# Patient Record
Sex: FEMALE | Race: WHITE | NOT HISPANIC OR LATINO | URBAN - METROPOLITAN AREA
[De-identification: names, ages, dates, MRNs, and addresses within clinical notes are randomized per-mention and may not be internally consistent; named-entity substitution may affect disease eponyms.]

---

## 2018-04-05 LAB — HCV AB SER-ACNC: NEGATIVE

## 2021-04-08 DIAGNOSIS — Z23 ENCOUNTER FOR IMMUNIZATION: ICD-10-CM

## 2023-02-16 ENCOUNTER — EVALUATION (OUTPATIENT)
Dept: PHYSICAL THERAPY | Age: 63
End: 2023-02-16

## 2023-02-16 DIAGNOSIS — M75.52 BURSITIS OF LEFT SHOULDER: Primary | ICD-10-CM

## 2023-02-16 NOTE — PROGRESS NOTES
PT Evaluation     Today's date: 2023  Patient name: Romario Holcomb  : 1960  MRN: [id number]  Referring provider: Nestor Alarcon  Dx:   Encounter Diagnosis     ICD-10-CM    1  Bursitis of left shoulder  M75 52                      Assessment  Assessment details: Patient with L shoulder bursitis - r/o internal derangement if no progress is achieved  Patient with decreased AROM, PROM, weakness, and postural dysfunction  Patient is a fair candidate for PT intervention  Impairments: abnormal muscle tone, abnormal or restricted ROM, impaired physical strength, lacks appropriate home exercise program and pain with function  Understanding of Dx/Px/POC: good   Prognosis: good    Goals  Short Term goals - 4 weeks  1  Patient will be independent HEP  2   Patient will report a 50% decrease in pain complaints  3   Increase strength 1/2 grade  4   Increase ROM 5-10 degrees  Long Term goals - 8 weeks  1  Patient will report elimination of pain complaints  2   Patient will return to all work related activities without restriction  3   Patient will return to all recreational activities without restriction  4   ROM WFL  5   Strength 5/5  Plan  Planned therapy interventions: manual therapy, strengthening, stretching and therapeutic exercise  Frequency: 2x week  Duration in weeks: 6        Subjective Evaluation    History of Present Illness  Mechanism of injury: Patient reports an onset of L shoulder pain a couple weeks ago  No GUERLINE  Current sx's:  L anterior/lateral shoulder pain with some discomfort into lateral deltoid region  Had injection which has helped sx's significantly  Patient is L hand dominant  Neg cervical sx's  Neg previous history of shoulder issues  Sx's aggravated by lifting  Neg c/o weakness    Quality of life: good    Pain  Current pain rating: 3  At best pain ratin  At worst pain ratin  Aggravating factors: overhead activity and lifting  Progression: improved    Patient Goals  Patient goals for therapy: increased motion, decreased pain, increased strength and independence with ADLs/IADLs          Objective     Passive Range of Motion   Left Shoulder   Flexion: 150 degrees with pain  External rotation 90°: 90 degrees   Internal rotation 90°: 50 degrees     Strength/Myotome Testing     Left Shoulder     Planes of Motion   Flexion: 4-   Abduction: 3+   External rotation at 0°: 3+   Internal rotation at 0°: 4     Tests     Left Shoulder   Positive drop arm, Neer's and painful arc  Negative external rotation lag sign, internal rotation lag sign, ULTT1, ULTT2, ULTT3 and ULTT4                Precautions: None      Manuals             PROM - gentle stretching into IR             Jt mobs - grade III AP and PA             MRE's IR/ER                          Neuro Re-Ed                                                                                                        Ther Ex             Sidelying ER             Prone abd             Prone ext                          [place] rows             Lillie shld ext             [employer] IR                                                                                                                     Ther Activity                                       Gait Training                                       Modalities

## 2023-02-20 ENCOUNTER — OFFICE VISIT (OUTPATIENT)
Dept: PHYSICAL THERAPY | Age: 63
End: 2023-02-20

## 2023-02-20 DIAGNOSIS — M75.52 BURSITIS OF LEFT SHOULDER: Primary | ICD-10-CM

## 2023-02-20 NOTE — PROGRESS NOTES
Daily Note     Today's date: 2023  Patient name: Taty Bonilla  : 1960  MRN: [id number]  Referring provider: Patricia Kline  Dx:   Encounter Diagnosis     ICD-10-CM    1  Bursitis of left shoulder  M75 52           Start Time:   Stop Time:   Total time in clinic (min): 45 minutes    Subjective: Pt denies any change in symptoms since previous session  She denies medical updates since previous visit  Objective: See treatment diary below      Assessment: Radha  tolerated treatment well with consistent cuing throughout  TE's were performed with increased resistance and increased reps  New TE's were demonstrated with proper technique, and tolerated well  Following treatment, the patient exhibited good technique with therapeutic exercises and would benefit from continued PT  Plan: Continue per plan of care        Precautions: None      Manuals             PROM - gentle stretching into IR FH            Jt mobs - grade III AP and PA             MRE's IR/ER FH x15            STM Biceps/Tric FH            Neuro Re-Ed                                                                                                        Ther Ex             Sidelying ER 2# 3x10            Prone abd 3x10            Prone ext 2# 3x10                         Lillie rows 10# 3x10            Lillie shld ext 6# 3x10            [place] IR 3# 3x10                                                                                                                    Ther Activity                                       Gait Training                                       Modalities No

## 2023-02-22 ENCOUNTER — OFFICE VISIT (OUTPATIENT)
Dept: PHYSICAL THERAPY | Age: 63
End: 2023-02-22

## 2023-02-22 DIAGNOSIS — M75.52 BURSITIS OF LEFT SHOULDER: Primary | ICD-10-CM

## 2023-02-22 NOTE — PROGRESS NOTES
Daily Note     Today's date: 2023  Patient name: Letha Alvarez  : 1960  MRN: [id number]  Referring provider: Awa Jenkins  Dx:   Encounter Diagnosis     ICD-10-CM    1  Bursitis of left shoulder  M75 52                      Subjective: Patient states after STM to bicep LV it made a difference in pain levels  Objective: See treatment diary below      Assessment: Tolerated treatment well  Able to increase reps and hold times showing improvement in muscular endurance  SL abd introduced for cont scapular strengthening with no increased pain or discomfort  Improvement in discomfort with shoulder ext this date compared to last, IR cont give feeling of tightness to lateral UE  Patient demonstrated fatigue post treatment and would benefit from continued PT      Plan: Continue per plan of care        Precautions: None      Manuals            PROM - gentle stretching into IR FH DELIA           Jt mobs - grade III AP and PA             MRE's IR/ER FH x15 DELIA x 15 ea at 90*            STM Biceps/Tric FH DELIA            Neuro Re-Ed                                                                                                        Ther Ex             Sidelying ER 2# 3x10 2#  2x15           Prone abd 3x10 3x10            Prone ext 2# 3x10 2# 3x10 3"           Sidelying Abd  1#   2x15           [place] rows 10# 3x10 10# 1x10  12#  2x10           Lillie shld ext 6# 3x10 6# 3x10           Lillie IR 3# 3x10 3#  3x10            [place] for ROM   2'/2'  Flex/scap                                                                                                      Ther Activity                                       Gait Training                                       Modalities

## 2023-02-27 ENCOUNTER — OFFICE VISIT (OUTPATIENT)
Dept: PHYSICAL THERAPY | Age: 63
End: 2023-02-27

## 2023-02-27 DIAGNOSIS — M75.52 BURSITIS OF LEFT SHOULDER: Primary | ICD-10-CM

## 2023-02-27 NOTE — PROGRESS NOTES
Daily Note     Today's date: 2023  Patient name: Thierno Johnston  : 1960  MRN: [id number]  Referring provider: Spencer Antonio  Dx:   Encounter Diagnosis     ICD-10-CM    1  Bursitis of left shoulder  M75 52                      Subjective: Patient with some increased sx's secondary       Objective: See treatment diary below      Assessment: Tolerated treatment well  Patient would benefit from continued PT      Plan: Continue per plan of care        Precautions: None      Manuals           PROM - gentle stretching into IR FH DELIA JH          Jt mobs - grade III AP and PA   completed          MRE's IR/ER FH x15 DELIA x 15 ea at 90*  completd          STM Biceps/Tric FH DELIA  JH          Neuro Re-Ed                                                                                                        Ther Ex             Sidelying ER 2# 3x10 2#  2x15 2#          Prone abd 3x10 3x10  1#          Prone ext 2# 3x10 2# 3x10 3" 1#          Sidelying Abd  1#   2x15 1#          Lillie rows 10# 3x10 10# 1x10  12#  2x10 completed          [place] shld ext 6# 3x10 6# 3x10 completd          Lillie IR 3# 3x10 3#  3x10  completed          [place] for ROM   2'/2'  Flex/scap nt                                                                                                     Ther Activity                                       Gait Training                                       Modalities

## 2023-03-01 ENCOUNTER — OFFICE VISIT (OUTPATIENT)
Dept: PHYSICAL THERAPY | Age: 63
End: 2023-03-01

## 2023-03-01 DIAGNOSIS — M75.52 BURSITIS OF LEFT SHOULDER: Primary | ICD-10-CM

## 2023-03-01 NOTE — PROGRESS NOTES
Daily Note     Today's date: 3/1/2023  Patient name: Mariano Abdalla  : 1960  MRN: [id number]  Referring provider: Emperatriz Varghese  Dx:   Encounter Diagnosis     ICD-10-CM    1  Bursitis of left shoulder  M75 52                      Subjective: Overall, patient is feeling better      Objective: See treatment diary below      Assessment: Tolerated treatment well  Patient would benefit from continued PT      Plan: One more visit to check progress       Precautions: None      Manuals 2/20 2/22 2/27 3/1         PROM - gentle stretching into IR FH [facility]         Jt mobs - grade III AP and PA   completed completed         MRE's IR/ER FH x15 DELIA x 15 ea at 90*  completd completed         STM Biceps/Tric FH [facility]         Neuro Re-Ed                                                                                                        Ther Ex             Sidelying ER 2# 3x10 2#  2x15 2# 2#         Prone abd 3x10 3x10  1# 1#         Prone ext 2# 3x10 2# 3x10 3" 1# 1#         Sidelying Abd  1#   2x15 1# 1#         [place] rows 10# 3x10 10# 1x10  12#  2x10 completed completed         Lillie shld ext 6# 3x10 6# 3x10 completd completed         Lillie IR 3# 3x10 3#  3x10  completed completed         [place] for ROM   2'/2'  Flex/scap nt nt                                                                                                    Ther Activity                                       Gait Training                                       Modalities

## 2023-03-16 ENCOUNTER — OFFICE VISIT (OUTPATIENT)
Dept: PHYSICAL THERAPY | Age: 63
End: 2023-03-16

## 2023-03-16 DIAGNOSIS — M75.52 BURSITIS OF LEFT SHOULDER: Primary | ICD-10-CM

## 2023-03-17 NOTE — PROGRESS NOTES
Daily Note     Today's date: 3/17/2023  Patient name: Garcia Peter  : 1960  MRN: [id number]  Referring provider: Leonie Agarwal  Dx:   Encounter Diagnosis     ICD-10-CM    1  Bursitis of left shoulder  M75 52                      Subjective: Patient feeling good - some persist with reaching activities and elevations but overall 90-95%,      Objective: See treatment diary below      Assessment: Tolerated treatment well         Plan: D/C to HEP     Precautions: None      Manuals 2/20 2/22 2/27 3/1 3/17        PROM - gentle stretching into IR FH [facility]        Jt mobs - grade III AP and PA   completed completed completed        MRE's IR/ER FH x15 DELIA x 15 ea at 90*  completd completed completed        STM Biceps/Tric FH [facility]        Neuro Re-Ed                                                                                                        Ther Ex             Sidelying ER 2# 3x10 2#  2x15 2# 2# 2#        Prone abd 3x10 3x10  1# 1# 2#        Prone ext 2# 3x10 2# 3x10 3" 1# 1# 2#        Sidelying Abd  1#   2x15 1# 1# 2#        [place] rows 10# 3x10 10# 1x10  12#  2x10 completed completed completed        [place] shld ext 6# 3x10 6# 3x10 completd completed completed        Lillie IR 3# 3x10 3#  3x10  completed completed completed        [place] for ROM   2'/2'  Flex/scap nt nt nt                                                                                                   Ther Activity                                       Gait Training                                       Modalities

## 2023-07-19 ENCOUNTER — OFFICE VISIT (OUTPATIENT)
Dept: GASTROENTEROLOGY | Facility: AMBULARY SURGERY CENTER | Age: 63
End: 2023-07-19
Payer: COMMERCIAL

## 2023-07-19 VITALS
DIASTOLIC BLOOD PRESSURE: 68 MMHG | HEIGHT: 65 IN | BODY MASS INDEX: 31.02 KG/M2 | SYSTOLIC BLOOD PRESSURE: 116 MMHG | RESPIRATION RATE: 18 BRPM | WEIGHT: 186.2 LBS | HEART RATE: 74 BPM

## 2023-07-19 DIAGNOSIS — Z12.11 SCREENING FOR COLON CANCER: ICD-10-CM

## 2023-07-19 DIAGNOSIS — K21.9 GASTROESOPHAGEAL REFLUX DISEASE, UNSPECIFIED WHETHER ESOPHAGITIS PRESENT: Primary | ICD-10-CM

## 2023-07-19 PROCEDURE — 99204 OFFICE O/P NEW MOD 45 MIN: CPT | Performed by: INTERNAL MEDICINE

## 2023-07-19 RX ORDER — HYDROCHLOROTHIAZIDE 25 MG/1
25 TABLET ORAL DAILY
COMMUNITY
Start: 2021-06-01 | End: 2023-09-29

## 2023-07-19 RX ORDER — DIPHENOXYLATE HYDROCHLORIDE AND ATROPINE SULFATE 2.5; .025 MG/1; MG/1
1 TABLET ORAL DAILY
COMMUNITY

## 2023-07-19 RX ORDER — OMEPRAZOLE 40 MG/1
40 CAPSULE, DELAYED RELEASE ORAL DAILY
COMMUNITY
Start: 2023-05-03 | End: 2023-07-20 | Stop reason: SDUPTHER

## 2023-07-19 NOTE — PROGRESS NOTES
[facility] - Outpatient Consultation  Pedro Hernandez 58 y.o. female MRN: [id number]  Encounter: [id number]      PCP: ADI Sue  Referring: Referral Self  No address on file      ASSESSMENT AND PLAN:      1. Gastroesophageal reflux disease, unspecified whether esophagitis present  Discussed anti-reflux measures, and offered handout detailing, including weight loss, avoidance of lying down after meals, recognize/avoid trigger foods, and elevating the head of bed. Continue omeprazole, partial improvement  - EGD; Future, assess for erosive disease or other    2. Screening for colon cancer  Last colonoscopy 10 years ago, will plan for repeat screening at this time  - Colonoscopy; Future  - sodium picosulfate, magnesium oxide, citric acid (Clenpiq) oral solution; Take 175 mL (1 bottle) the evening before the colonoscopy, between 5 PM and 9 PM, followed by a second 175 mL bottle 5 hours before the colonoscopy. Dispense: 350 mL; Refill: 0      ______________________________________________________________________    CC:  Chief Complaint   Patient presents with   • GERD       HPI:      Patient is a 56-year-old female referred for GERD, family history of colon cancer. She has acid reflux, hiatal hernia, allergic rhinitis, atrophic vaginitis, basal cell carcinoma of the skin, HTN, polyarthritis, prediabetes, vitamin D deficiency, BMI 31. She has 4 months of worsening heartburn, acid reflux symptoms. She is worried about her hiatal hernia. Symptoms were exacerbated when she started potassium replacement, did not improve when she stopped it. She has been taking omeprazole daily for the last 2 months, with partial relief of her symptoms. She does feel like they are now a 5 out of 10 in severity, previously severe. He denies any dysphagia, unintentional weight loss. She has daily bowel movements, and denies any rectal bleeding.   Her maternal grandfather was diagnosed with colon cancer in his 76s. Her last colonoscopy was performed over 10 years ago. REVIEW OF SYSTEMS:    CONSTITUTIONAL: Denies any fever, chills, rigors, and weight loss. HEENT: No earache or tinnitus. Denies hearing loss or visual disturbances. CARDIOVASCULAR: No chest pain or palpitations. RESPIRATORY: Denies any cough, hemoptysis, shortness of breath or dyspnea on exertion. GASTROINTESTINAL: As noted in the History of Present Illness. GENITOURINARY: No problems with urination. Denies any hematuria or dysuria. NEUROLOGIC: No dizziness or vertigo, denies headaches. MUSCULOSKELETAL: Denies any muscle or joint pain. SKIN: Denies skin rashes or itching. ENDOCRINE: Denies excessive thirst. Denies intolerance to heat or cold. PSYCHOSOCIAL: Denies depression or anxiety. Denies any recent memory loss. Historical Information   History reviewed. No pertinent past medical history. Past Surgical History:   Procedure Laterality Date   • COLONOSCOPY       Social History   Social History     Substance and Sexual Activity   Alcohol Use Yes    Comment: rarely     Social History     Substance and Sexual Activity   Drug Use Never     Social History     Tobacco Use   Smoking Status Never   Smokeless Tobacco Never     Family History   Problem Relation Age of Onset   • Colon cancer Maternal Grandfather        Meds/Allergies       Current Outpatient Medications:   •  hydrochlorothiazide (HYDRODIURIL) 25 mg tablet  •  multivitamin (THERAGRAN) TABS  •  omeprazole (PriLOSEC) 40 MG capsule    No Known Allergies        Objective     Blood pressure 116/68, pulse 74, resp. rate 18, height 5' 5" (1.651 m), weight 84.5 kg (186 lb 3.2 oz). Body mass index is 30.99 kg/m². PHYSICAL EXAM:      General Appearance:   Alert, cooperative, no distress   HEENT:   Normocephalic, atraumatic, anicteric.      Neck:  Supple, symmetrical, trachea midline   Lungs:   Clear to auscultation bilaterally; no rales, rhonchi or wheezing; respirations unlabored    Heart[de-identified]   Regular rate and rhythm; no murmur, rub, or gallop. Abdomen:   Soft, non-tender, non-distended; normal bowel sounds; no masses, no organomegaly    Genitalia:   Deferred    Rectal:   Deferred    Extremities:  No cyanosis, clubbing or edema    Pulses:  2+ and symmetric    Skin:  No jaundice, rashes, or lesions    Lymph nodes:  No palpable cervical lymphadenopathy        Lab Results:     No results found for: "WBC", "HGB", "HCT", "MCV", "PLT"    No results found for: "NA", "K", "CL", "CO2", "ANIONGAP", "BUN", "CREATININE", "GLUCOSE", "GLUF", "CALCIUM", "CORRECTEDCA", "AST", "ALT", "ALKPHOS", "PROT", "BILITOT", "EGFR"    No results found for: "INR", "PROTIME"      Radiology Results:   No results found. Portions of the record may have been created with voice recognition software. Occasional wrong word or "sound a like" substitutions may have occurred due to the inherent limitations of voice recognition software. Read the chart carefully and recognize, using context, where substitutions have occurred.

## 2023-07-20 DIAGNOSIS — K21.9 GASTROESOPHAGEAL REFLUX DISEASE, UNSPECIFIED WHETHER ESOPHAGITIS PRESENT: Primary | ICD-10-CM

## 2023-07-24 RX ORDER — OMEPRAZOLE 40 MG/1
40 CAPSULE, DELAYED RELEASE ORAL DAILY
Qty: 30 CAPSULE | Refills: 11 | Status: SHIPPED | OUTPATIENT
Start: 2023-07-24 | End: 2024-07-23

## 2023-07-25 NOTE — PATIENT INSTRUCTIONS
Scheduled date of EGD/colonoscopy (as of today):  10/16/23  Physician performing EGD/colonoscopy: Dr Mike Mejia   Location of EGD/colonoscopy:  [facility]   Desired bowel prep reviewed with patient: Clenpiq  Instructions reviewed with patient by: Lucero SCHUMACHER   Clearances:   N/A

## 2023-08-14 ENCOUNTER — OFFICE VISIT (OUTPATIENT)
Dept: INTERNAL MEDICINE CLINIC | Facility: CLINIC | Age: 63
End: 2023-08-14
Payer: COMMERCIAL

## 2023-08-14 VITALS
OXYGEN SATURATION: 99 % | BODY MASS INDEX: 30.66 KG/M2 | HEIGHT: 65 IN | SYSTOLIC BLOOD PRESSURE: 128 MMHG | DIASTOLIC BLOOD PRESSURE: 80 MMHG | HEART RATE: 85 BPM | WEIGHT: 184 LBS

## 2023-08-14 DIAGNOSIS — K21.9 GASTROESOPHAGEAL REFLUX DISEASE, UNSPECIFIED WHETHER ESOPHAGITIS PRESENT: ICD-10-CM

## 2023-08-14 DIAGNOSIS — Z13.220 SCREENING, LIPID: ICD-10-CM

## 2023-08-14 DIAGNOSIS — Z13.1 SCREENING FOR DIABETES MELLITUS: ICD-10-CM

## 2023-08-14 DIAGNOSIS — F32.A DEPRESSION, UNSPECIFIED DEPRESSION TYPE: ICD-10-CM

## 2023-08-14 DIAGNOSIS — R45.4 IRRITABILITY: ICD-10-CM

## 2023-08-14 DIAGNOSIS — I10 HYPERTENSION, ESSENTIAL, BENIGN: ICD-10-CM

## 2023-08-14 DIAGNOSIS — Z00.00 ANNUAL PHYSICAL EXAM: Primary | ICD-10-CM

## 2023-08-14 PROCEDURE — 99386 PREV VISIT NEW AGE 40-64: CPT | Performed by: INTERNAL MEDICINE

## 2023-08-14 RX ORDER — FLUOXETINE 10 MG/1
10 CAPSULE ORAL DAILY
Qty: 30 CAPSULE | Refills: 3 | Status: SHIPPED | OUTPATIENT
Start: 2023-08-14

## 2023-08-14 NOTE — PROGRESS NOTES
ADULT ANNUAL [facility] [place]     NAME: Michelle Barahona  AGE: 58 y.o. SEX: female  : 1960     DATE: 2023     Assessment and Plan:     Problem List Items Addressed This Visit        Digestive    Gastroesophageal reflux disease      It has improved on omeprazole 40mg but still with mild symptoms. Her EGD is not until October. I recommended she add OTC famotidine (Pepcid) for at least 2 weeks and let me know her response. Continue avoiding NSAIDs            Cardiovascular and Mediastinum    Hypertension, essential, benign     Continue HCTZ 25mg a day  K runs on the low end but she did not tolerate K supplement  Discussed increasing K in diet         Relevant Orders    CBC and differential    Comprehensive metabolic panel       Other    Depression     Reports lack of motivation, mild depression and irritability. One stressor is elderly mother's condition  Ready to start treatment and I recommended a low dose of an SSRI like fluoxetine  Advised to report response as dose can be raised         Relevant Medications    FLUoxetine (PROzac) 10 mg capsule    Irritability    Relevant Medications    FLUoxetine (PROzac) 10 mg capsule   Other Visit Diagnoses     Annual physical exam    -  Primary    Screening for diabetes mellitus        Relevant Orders    HEMOGLOBIN A1C W/ EAG ESTIMATION    Screening, lipid        Relevant Orders    Lipid panel        Add famotidine (Pepcid)    Immunizations and preventive care screenings were discussed with patient today. Appropriate education was printed on patient's after visit summary. Counseling:  Exercise: the importance of regular exercise/physical activity was discussed. Recommend exercise 3-5 times per week for at least 30 minutes. BMI Counseling: Body mass index is 30.62 kg/m². The BMI is above normal. Exercise recommendations include exercising 3-5 times per week.  Rationale for BMI follow-up plan is due to patient being overweight or obese. Depression Screening and Follow-up Plan: Patient was screened for depression during today's encounter. They screened negative with a PHQ-2 score of 2. Return in about 6 months (around 2/14/2024). Chief Complaint:     Chief Complaint   Patient presents with   • Establish Care      History of Present Illness:     Adult Annual Physical   Patient here for a comprehensive physical exam. The patient reports problems - lack of motivation, irritability. Diet and Physical Activity  Diet/Nutrition: well balanced diet. Exercise: walking. Depression Screening  PHQ-2/9 Depression Screening    Little interest or pleasure in doing things: 1 - several days  Feeling down, depressed, or hopeless: 1 - several days  PHQ-2 Score: 2  PHQ-2 Interpretation: Negative depression screen       General Health  Sleep: sleeps well. Hearing: decreased - bilateral and requires use of hearing aids. Vision: goes for regular eye exams and wears glasses. Dental: regular dental visits. /GYN Health  Patient is: postmenopausal     Review of Systems:     Review of Systems   Constitutional: Negative for fever and unexpected weight change. HENT: Negative for trouble swallowing. Respiratory: Negative for shortness of breath. Cardiovascular: Negative for chest pain and palpitations. Gastrointestinal: Positive for abdominal pain. Negative for blood in stool, constipation and diarrhea. Genitourinary: Negative for difficulty urinating. Neurological: Negative for dizziness and headaches. Psychiatric/Behavioral: Positive for dysphoric mood. Negative for hallucinations, sleep disturbance and suicidal ideas. The patient is not nervous/anxious.        Past Medical History:     Past Medical History:   Diagnosis Date   • Arthritis 1985   • Cancer ([address]) 2018    skin cancer basal cell   • GERD (gastroesophageal reflux disease) 1985    unsure of date   • HL (hearing loss) lifetime    hearing aids in 2021   • Hypertension     5 years   • Shingles 1980’s    a few times over the past many years.  the last time was in 2018      Past Surgical History:     Past Surgical History:   Procedure Laterality Date   • COLONOSCOPY     • KNEE SURGERY  2006    arthroscopy left knee 2006      Social History:     Social History     Socioeconomic History   • Marital status: /Civil Union     Spouse name: None   • Number of children: None   • Years of education: None   • Highest education level: None   Occupational History   • None   Tobacco Use   • Smoking status: Never   • Smokeless tobacco: Never   Vaping Use   • Vaping Use: Never used   Substance and Sexual Activity   • Alcohol use: Not Currently     Alcohol/week: 1.0 standard drink of alcohol     Types: 1 Standard drinks or equivalent per week     Comment: rarely   • Drug use: Never   • Sexual activity: Yes     Partners: Male     Birth control/protection: Post-menopausal   Other Topics Concern   • None   Social History Narrative   • None     Social Determinants of Health     Financial Resource Strain: Not on file   Food Insecurity: Not on file   Transportation Needs: Not on file   Physical Activity: Not on file   Stress: Not on file   Social Connections: Not on file   Intimate Partner Violence: Not on file   Housing Stability: Not on file      Family History:     Family History   Problem Relation Age of Onset   • No Known Problems Mother    • Hypertension Father    • Diabetes Father    • Hearing loss Father    • Breast cancer Maternal Grandmother    • Colon cancer Maternal Grandfather    • Breast cancer Maternal Aunt    • Breast cancer Maternal Aunt       Current Medications:     Current Outpatient Medications   Medication Sig Dispense Refill   • FLUoxetine (PROzac) 10 mg capsule Take 1 capsule (10 mg total) by mouth daily 30 capsule 3   • hydrochlorothiazide (HYDRODIURIL) 25 mg tablet Take 25 mg by mouth daily     • multivitamin (THERAGRAN) TABS Take 1 tablet by mouth daily     • omeprazole (PriLOSEC) 40 MG capsule Take 1 capsule (40 mg total) by mouth daily 30 capsule 11   • sodium picosulfate, magnesium oxide, citric acid (Clenpiq) oral solution Take 175 mL (1 bottle) the evening before the colonoscopy, between 5 PM and 9 PM, followed by a second 175 mL bottle 5 hours before the colonoscopy. 350 mL 0     No current facility-administered medications for this visit. Allergies:     No Known Allergies   Physical Exam:     /80   Pulse 85   Ht 5' 5" (1.651 m)   Wt 83.5 kg (184 lb)   SpO2 99%   BMI 30.62 kg/m²     Physical Exam  Constitutional:       General: She is not in acute distress. Appearance: She is well-developed. She is not ill-appearing, toxic-appearing or diaphoretic. HENT:      Head: Normocephalic and atraumatic. Right Ear: External ear normal. There is no impacted cerumen. Left Ear: External ear normal. There is no impacted cerumen. Eyes:      Conjunctiva/sclera: Conjunctivae normal.   Cardiovascular:      Rate and Rhythm: Normal rate and regular rhythm. Heart sounds: Normal heart sounds. No murmur heard. Pulmonary:      Effort: Pulmonary effort is normal. No respiratory distress. Breath sounds: Normal breath sounds. No stridor. No wheezing or rales. Abdominal:      General: There is no distension. Palpations: Abdomen is soft. There is no mass. Tenderness: There is no abdominal tenderness. There is no guarding or rebound. Musculoskeletal:      Cervical back: Neck supple. Right lower leg: No edema. Left lower leg: No edema. Neurological:      Mental Status: She is alert and oriented to person, place, and time. Psychiatric:         Mood and Affect: Mood normal.         Behavior: Behavior normal.         Thought Content: Thought content normal. Thought content does not include suicidal ideation.          Judgment: Judgment normal.          Itzel Coleman MD  [facility]

## 2023-08-14 NOTE — ASSESSMENT & PLAN NOTE
Reports lack of motivation, mild depression and irritability.  One stressor is elderly mother's condition  Ready to start treatment and I recommended a low dose of an SSRI like fluoxetine  Advised to report response as dose can be raised

## 2023-08-14 NOTE — ASSESSMENT & PLAN NOTE
Continue HCTZ 25mg a day  K runs on the low end but she did not tolerate K supplement  Discussed increasing K in diet

## 2023-08-14 NOTE — ASSESSMENT & PLAN NOTE
It has improved on omeprazole 40mg but still with mild symptoms. Her EGD is not until October. I recommended she add OTC famotidine (Pepcid) for at least 2 weeks and let me know her response.    Continue avoiding NSAIDs

## 2023-08-14 NOTE — PROGRESS NOTES
Assessment/Plan:    No problem-specific Assessment & Plan notes found for this encounter. Problem List Items Addressed This Visit    None        Subjective:      Patient ID: Angelique Alston is a 58 y.o. female.     HPI    The following portions of the patient's history were reviewed and updated as appropriate: allergies, current medications, past family history, past medical history, past social history, past surgical history and problem list.    Review of Systems      Objective:      /80 (BP Location: Left arm, Patient Position: Sitting, Cuff Size: Standard)   Pulse 85   Ht 5' 5" (1.651 m)   Wt 83.5 kg (184 lb)   SpO2 99%   BMI 30.62 kg/m²          Physical Exam

## 2023-08-15 ENCOUNTER — TELEPHONE (OUTPATIENT)
Dept: ADMINISTRATIVE | Facility: OTHER | Age: 63
End: 2023-08-15

## 2023-08-15 NOTE — TELEPHONE ENCOUNTER
----- Message from Nora Perez MD sent at 8/14/2023  6:40 PM EDT -----  08/14/23 6:40 PM    Hello, our patient Amor Borjas has had Hepatitis C, Mammogram, and Pap Smear (HPV) aka Cervical Cancer Screening completed/performed. Please assist in updating the patient chart by pulling the Care Everywhere (CE) document. The date of service is hep C 4/2018, mammo and pap 4/2023.      Thank you,  Nora Perez MD  [facility]

## 2023-08-15 NOTE — TELEPHONE ENCOUNTER
Upon review of the In Basket request we were able to locate, review, and update the patient chart as requested for Hepatitis C , Mammogram and Pap Smear (HPV) aka Cervical Cancer Screening. Any additional questions or concerns should be emailed to the Practice Liaisons via the appropriate education email address, please do not reply via In Basket.     Thank you  Hal Carlin

## 2023-09-06 DIAGNOSIS — R45.4 IRRITABILITY: ICD-10-CM

## 2023-09-06 DIAGNOSIS — F32.A DEPRESSION, UNSPECIFIED DEPRESSION TYPE: ICD-10-CM

## 2023-09-06 RX ORDER — FLUOXETINE 10 MG/1
10 CAPSULE ORAL DAILY
Qty: 90 CAPSULE | Refills: 2 | Status: SHIPPED | OUTPATIENT
Start: 2023-09-06

## 2023-09-19 DIAGNOSIS — I10 HYPERTENSION, ESSENTIAL, BENIGN: Primary | ICD-10-CM

## 2023-09-19 RX ORDER — HYDROCHLOROTHIAZIDE 25 MG/1
25 TABLET ORAL DAILY
Qty: 90 TABLET | Refills: 1 | Status: SHIPPED | OUTPATIENT
Start: 2023-09-19 | End: 2026-01-16

## 2023-10-16 ENCOUNTER — ANESTHESIA EVENT (OUTPATIENT)
Dept: GASTROENTEROLOGY | Facility: AMBULARY SURGERY CENTER | Age: 63
End: 2023-10-16

## 2023-10-16 ENCOUNTER — HOSPITAL ENCOUNTER (OUTPATIENT)
Dept: GASTROENTEROLOGY | Facility: AMBULARY SURGERY CENTER | Age: 63
Setting detail: OUTPATIENT SURGERY
Discharge: HOME | End: 2023-10-16
Attending: INTERNAL MEDICINE
Payer: COMMERCIAL

## 2023-10-16 ENCOUNTER — ANESTHESIA (OUTPATIENT)
Dept: GASTROENTEROLOGY | Facility: AMBULARY SURGERY CENTER | Age: 63
End: 2023-10-16

## 2023-10-16 VITALS
OXYGEN SATURATION: 98 % | RESPIRATION RATE: 19 BRPM | DIASTOLIC BLOOD PRESSURE: 71 MMHG | HEART RATE: 75 BPM | BODY MASS INDEX: 29.82 KG/M2 | SYSTOLIC BLOOD PRESSURE: 109 MMHG | TEMPERATURE: 97.2 F | WEIGHT: 179 LBS | HEIGHT: 65 IN

## 2023-10-16 DIAGNOSIS — Z12.11 SCREENING FOR COLON CANCER: ICD-10-CM

## 2023-10-16 DIAGNOSIS — K21.9 GASTROESOPHAGEAL REFLUX DISEASE, UNSPECIFIED WHETHER ESOPHAGITIS PRESENT: ICD-10-CM

## 2023-10-16 PROCEDURE — 88305 TISSUE EXAM BY PATHOLOGIST: CPT | Performed by: PATHOLOGY

## 2023-10-16 RX ORDER — PROPOFOL 10 MG/ML
INJECTION, EMULSION INTRAVENOUS AS NEEDED
Status: DISCONTINUED | OUTPATIENT
Start: 2023-10-16 | End: 2023-10-16

## 2023-10-16 RX ORDER — SODIUM CHLORIDE, SODIUM LACTATE, POTASSIUM CHLORIDE, CALCIUM CHLORIDE 600; 310; 30; 20 MG/100ML; MG/100ML; MG/100ML; MG/100ML
INJECTION, SOLUTION INTRAVENOUS CONTINUOUS PRN
Status: DISCONTINUED | OUTPATIENT
Start: 2023-10-16 | End: 2023-10-16

## 2023-10-16 RX ADMIN — PROPOFOL 100 MG: 10 INJECTION, EMULSION INTRAVENOUS at 07:44

## 2023-10-16 RX ADMIN — SODIUM CHLORIDE, SODIUM LACTATE, POTASSIUM CHLORIDE, AND CALCIUM CHLORIDE: .6; .31; .03; .02 INJECTION, SOLUTION INTRAVENOUS at 07:01

## 2023-10-16 RX ADMIN — PROPOFOL 100 MG: 10 INJECTION, EMULSION INTRAVENOUS at 07:36

## 2023-10-16 RX ADMIN — SODIUM CHLORIDE, SODIUM LACTATE, POTASSIUM CHLORIDE, AND CALCIUM CHLORIDE: .6; .31; .03; .02 INJECTION, SOLUTION INTRAVENOUS at 08:07

## 2023-10-16 RX ADMIN — PROPOFOL 100 MG: 10 INJECTION, EMULSION INTRAVENOUS at 07:51

## 2023-10-16 RX ADMIN — PROPOFOL 50 MG: 10 INJECTION, EMULSION INTRAVENOUS at 07:54

## 2023-10-16 RX ADMIN — PROPOFOL 100 MG: 10 INJECTION, EMULSION INTRAVENOUS at 07:48

## 2023-10-16 NOTE — ANESTHESIA PREPROCEDURE EVALUATION
Procedure:  COLONOSCOPY  EGD    Relevant Problems   CARDIO   (+) Hypertension, essential, benign      GI/HEPATIC   (+) Gastroesophageal reflux disease      NEURO/PSYCH   (+) Depression        Physical Exam    Airway    Mallampati score: II  TM Distance: >3 FB  Neck ROM: full     Dental       Cardiovascular      Pulmonary      Other Findings        Anesthesia Plan  ASA Score- 2     Anesthesia Type- IV sedation with anesthesia with ASA Monitors. Additional Monitors:     Airway Plan:            Plan Factors-Exercise tolerance (METS): >4 METS. Chart reviewed. Patient is not a current smoker. Patient did not smoke on day of surgery. Obstructive sleep apnea risk education given perioperatively. Induction- intravenous. Postoperative Plan-     Informed Consent- Anesthetic plan and risks discussed with patient. I personally reviewed this patient with the CRNA. Discussed and agreed on the Anesthesia Plan with the CRNA. .            NPO appropriate. Discussed benefits/risks of monitored anesthetic care and discussed providing a dynamic level of mild to deep sedation. Risks include awareness, airway obstruction, aspiration which may necessitate conversion to general anesthesia. All questions answered. Patient understands and wishes to proceed. Anesthesia plan and consent discussed with Alicia Hampton who expressed understanding and agreement. Risks/benefits and alternatives discussed with patient including possible PONV, sore throat, damage to teeth/lips/gums and possibility of rare anesthetic and surgical emergencies. Universal Safety Interventions

## 2023-10-16 NOTE — H&P
History and Physical - [facility]  Zander Tyler 58 y.o. female MRN: [id number]                  HPI: Zander Tyler is a 58y.o. year old female who presents for screening and GERD. REVIEW OF SYSTEMS: Per the HPI, and otherwise unremarkable. Historical Information   Past Medical History:   Diagnosis Date    Arthritis 1985    Cancer ([address]) 2018    skin cancer basal cell    GERD (gastroesophageal reflux disease) 1985    unsure of date    HL (hearing loss) lifetime    hearing aids in 2021    Hypertension     5 years    Shingles 1980’s    a few times over the past many years. the last time was in 2018     Past Surgical History:   Procedure Laterality Date    COLONOSCOPY      KNEE SURGERY  2006    arthroscopy left knee 2006    WISDOM TOOTH EXTRACTION       Social History   Social History     Substance and Sexual Activity   Alcohol Use Not Currently    Alcohol/week: 1.0 standard drink of alcohol    Types: 1 Standard drinks or equivalent per week    Comment: rarely     Social History     Substance and Sexual Activity   Drug Use Never     Social History     Tobacco Use   Smoking Status Never   Smokeless Tobacco Never     Family History   Problem Relation Age of Onset    No Known Problems Mother     Hypertension Father     Diabetes Father     Hearing loss Father     Breast cancer Maternal Grandmother     Colon cancer Maternal Grandfather     Breast cancer Maternal Aunt     Breast cancer Maternal Aunt        Meds/Allergies       Current Outpatient Medications:     FLUoxetine (PROzac) 10 mg capsule    hydrochlorothiazide (HYDRODIURIL) 25 mg tablet    multivitamin (THERAGRAN) TABS    omeprazole (PriLOSEC) 40 MG capsule  No current facility-administered medications for this encounter.     Facility-Administered Medications Ordered in Other Encounters:     lactated ringers infusion, , Intravenous, Continuous PRN, New Bag at 10/16/23 0701    No Known Allergies    Objective     /90   Pulse 94 Temp (!) 97.3 °F (36.3 °C) (Temporal)   Resp 16   Ht 5' 5" (1.651 m)   Wt 81.2 kg (179 lb)   SpO2 96%   BMI 29.79 kg/m²       PHYSICAL EXAM    Gen: NAD  Head: NCAT  CV: RRR  CHEST: Clear  ABD: soft, NT/ND  EXT: no edema      ASSESSMENT/PLAN:  This is a 58y.o. year old female here for EGD & colonoscopy, and she is stable and optimized for her procedure.

## 2023-10-16 NOTE — ANESTHESIA POSTPROCEDURE EVALUATION
Post-Op Assessment Note    CV Status:  Stable  Pain Score: 0    Pain management: adequate     Mental Status:  Alert and awake   Hydration Status:  Euvolemic   PONV Controlled:  Controlled   Airway Patency:  Patent      Post Op Vitals Reviewed: Yes      Staff: CRNA         No notable events documented.     BP   99/62   Temp  98   Pulse  64   Resp   16   SpO2   94

## 2024-03-29 LAB
ALBUMIN SERPL-MCNC: 4.2 G/DL (ref 3.6–5.1)
ALBUMIN/GLOB SERPL: 1.3 (CALC) (ref 1–2.5)
ALP SERPL-CCNC: 81 U/L (ref 37–153)
ALT SERPL-CCNC: 27 U/L (ref 6–29)
AST SERPL-CCNC: 28 U/L (ref 10–35)
BASOPHILS # BLD AUTO: 38 CELLS/UL (ref 0–200)
BASOPHILS NFR BLD AUTO: 0.6 %
BILIRUB SERPL-MCNC: 1.6 MG/DL (ref 0.2–1.2)
BUN SERPL-MCNC: 15 MG/DL (ref 7–25)
BUN/CREAT SERPL: ABNORMAL (CALC) (ref 6–22)
CALCIUM SERPL-MCNC: 9.4 MG/DL (ref 8.6–10.4)
CHLORIDE SERPL-SCNC: 99 MMOL/L (ref 98–110)
CHOLEST SERPL-MCNC: 188 MG/DL
CHOLEST/HDLC SERPL: 3.5 (CALC)
CO2 SERPL-SCNC: 32 MMOL/L (ref 20–32)
CREAT SERPL-MCNC: 0.63 MG/DL (ref 0.5–1.05)
EOSINOPHIL # BLD AUTO: 218 CELLS/UL (ref 15–500)
EOSINOPHIL NFR BLD AUTO: 3.4 %
ERYTHROCYTE [DISTWIDTH] IN BLOOD BY AUTOMATED COUNT: 14.4 % (ref 11–15)
EST. AVERAGE GLUCOSE BLD GHB EST-MCNC: 126 MG/DL
EST. AVERAGE GLUCOSE BLD GHB EST-SCNC: 7 MMOL/L
GFR/BSA.PRED SERPLBLD CYS-BASED-ARV: 99 ML/MIN/1.73M2
GLOBULIN SER CALC-MCNC: 3.3 G/DL (CALC) (ref 1.9–3.7)
GLUCOSE SERPL-MCNC: 89 MG/DL (ref 65–99)
HBA1C MFR BLD: 6 % OF TOTAL HGB
HCT VFR BLD AUTO: 42.1 % (ref 35–45)
HDLC SERPL-MCNC: 54 MG/DL
HGB BLD-MCNC: 14.3 G/DL (ref 11.7–15.5)
LDLC SERPL CALC-MCNC: 109 MG/DL (CALC)
LYMPHOCYTES # BLD AUTO: 2048 CELLS/UL (ref 850–3900)
LYMPHOCYTES NFR BLD AUTO: 32 %
MCH RBC QN AUTO: 31.2 PG (ref 27–33)
MCHC RBC AUTO-ENTMCNC: 34 G/DL (ref 32–36)
MCV RBC AUTO: 91.7 FL (ref 80–100)
MONOCYTES # BLD AUTO: 467 CELLS/UL (ref 200–950)
MONOCYTES NFR BLD AUTO: 7.3 %
NEUTROPHILS # BLD AUTO: 3629 CELLS/UL (ref 1500–7800)
NEUTROPHILS NFR BLD AUTO: 56.7 %
NONHDLC SERPL-MCNC: 134 MG/DL (CALC)
PLATELET # BLD AUTO: 276 THOUSAND/UL (ref 140–400)
PMV BLD REES-ECKER: 10.3 FL (ref 7.5–12.5)
POTASSIUM SERPL-SCNC: 3.7 MMOL/L (ref 3.5–5.3)
PROT SERPL-MCNC: 7.5 G/DL (ref 6.1–8.1)
RBC # BLD AUTO: 4.59 MILLION/UL (ref 3.8–5.1)
SODIUM SERPL-SCNC: 138 MMOL/L (ref 135–146)
TRIGL SERPL-MCNC: 135 MG/DL
WBC # BLD AUTO: 6.4 THOUSAND/UL (ref 3.8–10.8)

## 2024-04-08 ENCOUNTER — OFFICE VISIT (OUTPATIENT)
Dept: INTERNAL MEDICINE CLINIC | Facility: CLINIC | Age: 64
End: 2024-04-08
Payer: COMMERCIAL

## 2024-04-08 VITALS
DIASTOLIC BLOOD PRESSURE: 82 MMHG | BODY MASS INDEX: 30.82 KG/M2 | HEIGHT: 65 IN | HEART RATE: 97 BPM | OXYGEN SATURATION: 96 % | SYSTOLIC BLOOD PRESSURE: 122 MMHG | WEIGHT: 185 LBS

## 2024-04-08 DIAGNOSIS — M17.0 PRIMARY OSTEOARTHRITIS OF BOTH KNEES: ICD-10-CM

## 2024-04-08 DIAGNOSIS — I10 HYPERTENSION, ESSENTIAL, BENIGN: Primary | ICD-10-CM

## 2024-04-08 DIAGNOSIS — F32.4 MAJOR DEPRESSIVE DISORDER WITH SINGLE EPISODE, IN PARTIAL REMISSION (HCC): ICD-10-CM

## 2024-04-08 DIAGNOSIS — K21.9 GASTROESOPHAGEAL REFLUX DISEASE, UNSPECIFIED WHETHER ESOPHAGITIS PRESENT: ICD-10-CM

## 2024-04-08 PROCEDURE — 99214 OFFICE O/P EST MOD 30 MIN: CPT | Performed by: INTERNAL MEDICINE

## 2024-04-08 RX ORDER — HYDROCHLOROTHIAZIDE 25 MG/1
25 TABLET ORAL DAILY
Qty: 90 TABLET | Refills: 2 | Status: SHIPPED | OUTPATIENT
Start: 2024-04-08 | End: 2026-08-06

## 2024-04-08 RX ORDER — MELOXICAM 7.5 MG/1
7.5 TABLET ORAL DAILY PRN
Qty: 90 TABLET | Refills: 0 | Status: SHIPPED | OUTPATIENT
Start: 2024-04-08

## 2024-04-08 NOTE — PROGRESS NOTES
Name: Radha Black      : 1960      MRN: [id number]  Encounter Provider: Lea Reyes, MD  Encounter Date: 2024   [facility]    Assessment & Plan     1. Hypertension, essential, benign  Assessment & Plan:  Controlled on HCTZ    Orders:  -     hydroCHLOROthiazide 25 mg tablet; Take 1 tablet (25 mg total) by mouth daily    2. Gastroesophageal reflux disease, unspecified whether esophagitis present  Assessment & Plan:  Controlled on omeprazole      3. Primary osteoarthritis of both knees  Assessment & Plan:  See ortho as scheduled  May resume meloxicam-discussed that it can cause GERD so she must continue the omeprazole and stop if that worsens    Orders:  -     meloxicam (MOBIC) 7.5 mg tablet; Take 1 tablet (7.5 mg total) by mouth daily as needed for moderate pain    4. Major depressive disorder with single episode, in partial remission (HCC)  Assessment & Plan:  Improved on fluoxetine and is considering weaning off             Subjective      Here for a follow up  Started fluoxetine in the fall which has helped reduce her depression, irritability  Recent labs reviewed and A1C , LDL slightly elevated  EGD and colonoscopy in October unremarkable  Seeing ortho for pain in the knees left>right  Knows she has arthritis in both knees  Requesting meloxicam      Review of Systems   Respiratory:  Negative for shortness of breath.    Cardiovascular:  Negative for chest pain and palpitations.   Gastrointestinal:  Negative for abdominal pain, constipation and diarrhea.   Musculoskeletal:  Positive for arthralgias.   Neurological:  Negative for dizziness and headaches.       Current Outpatient Medications on File Prior to Visit   Medication Sig   • FLUoxetine (PROzac) 10 mg capsule TAKE 1 CAPSULE BY MOUTH EVERY DAY   • multivitamin (THERAGRAN) TABS Take 1 tablet by mouth daily   • omeprazole (PriLOSEC) 40 MG capsule Take 1 capsule (40 mg total) by mouth daily   •  "[DISCONTINUED] hydrochlorothiazide (HYDRODIURIL) 25 mg tablet Take 1 tablet (25 mg total) by mouth daily       Objective     /82 (BP Location: Left arm, Patient Position: Sitting, Cuff Size: Standard)   Pulse 97   Ht 5' 5\" (1.651 m)   Wt 83.9 kg (185 lb)   SpO2 96%   BMI 30.79 kg/m²     Physical Exam  Constitutional:       Appearance: She is well-developed. She is not ill-appearing.   Eyes:      Conjunctiva/sclera: Conjunctivae normal.   Cardiovascular:      Rate and Rhythm: Normal rate and regular rhythm.      Heart sounds: Normal heart sounds. No murmur heard.  Pulmonary:      Effort: Pulmonary effort is normal. No respiratory distress.      Breath sounds: Normal breath sounds. No wheezing or rales.   Abdominal:      General: There is no distension.      Palpations: Abdomen is soft. There is no mass.      Tenderness: There is no abdominal tenderness. There is no guarding or rebound.   Musculoskeletal:      Cervical back: Neck supple.      Right lower leg: No edema.      Left lower leg: No edema.   Neurological:      Mental Status: She is alert and oriented to person, place, and time.   Psychiatric:         Mood and Affect: Mood normal.         Behavior: Behavior normal.         Thought Content: Thought content normal.         Judgment: Judgment normal.       Lea Reyes, MD    "

## 2024-04-08 NOTE — ASSESSMENT & PLAN NOTE
See ortho as scheduled  May resume meloxicam-discussed that it can cause GERD so she must continue the omeprazole and stop if that worsens

## 2024-05-10 ENCOUNTER — OFFICE VISIT (OUTPATIENT)
Dept: OBGYN CLINIC | Facility: CLINIC | Age: 64
End: 2024-05-10
Payer: COMMERCIAL

## 2024-05-10 ENCOUNTER — APPOINTMENT (OUTPATIENT)
Dept: RADIOLOGY | Facility: AMBULARY SURGERY CENTER | Age: 64
End: 2024-05-10
Attending: ORTHOPAEDIC SURGERY
Payer: COMMERCIAL

## 2024-05-10 VITALS
SYSTOLIC BLOOD PRESSURE: 168 MMHG | WEIGHT: 189 LBS | BODY MASS INDEX: 31.49 KG/M2 | DIASTOLIC BLOOD PRESSURE: 93 MMHG | HEART RATE: 83 BPM | HEIGHT: 65 IN

## 2024-05-10 DIAGNOSIS — M17.0 PRIMARY OSTEOARTHRITIS OF BOTH KNEES: ICD-10-CM

## 2024-05-10 DIAGNOSIS — M25.561 PAIN IN BOTH KNEES, UNSPECIFIED CHRONICITY: Primary | ICD-10-CM

## 2024-05-10 DIAGNOSIS — M25.561 PAIN IN BOTH KNEES, UNSPECIFIED CHRONICITY: ICD-10-CM

## 2024-05-10 DIAGNOSIS — M25.562 PAIN IN BOTH KNEES, UNSPECIFIED CHRONICITY: ICD-10-CM

## 2024-05-10 DIAGNOSIS — M62.81 WEAKNESS OF BOTH QUADRICEPS MUSCLES: ICD-10-CM

## 2024-05-10 DIAGNOSIS — M25.562 PAIN IN BOTH KNEES, UNSPECIFIED CHRONICITY: Primary | ICD-10-CM

## 2024-05-10 PROCEDURE — 99204 OFFICE O/P NEW MOD 45 MIN: CPT | Performed by: ORTHOPAEDIC SURGERY

## 2024-05-10 PROCEDURE — 73562 X-RAY EXAM OF KNEE 3: CPT

## 2024-05-10 NOTE — PATIENT INSTRUCTIONS
It's important to work the muscle on the inside of the quadriceps; called the Vastus medialis oblique (VMO). When this is stronger, we have less pain when going up from a seated position.   She wishes to loose 20lbs in 4 months, which is 5lbs a month.       STRENGTHENING:   Quadriceps:   Isometric Quad sets

## 2024-05-10 NOTE — PROGRESS NOTES
Assessment:   Diagnosis ICD-10-CM Associated Orders   1. Pain in both knees, unspecified chronicity  M25.561 XR knee 3 vw left non injury    M25.562 XR knee 3 vw right non injury      2. Primary osteoarthritis of both knees  M17.0       3. Weakness of both quadriceps muscles  M62.81           Plan:  X-rays of bilateral knees were obtained today and reviewed with the patient  On exam, she pain on the lateral aspect of the knees and over the patellofemoral joint.   She has pain over the patellofemoral joint with climbing stairs, going from sit to stand.  We recommend addressing her VMO strength with exercises. Exercises were provided and demonstrated in the office by a certified athletic training. Patient also has the option of going to formal PT.   Since the meloxicam 7.5mg is giving her adequate relief, we will have her continue that medication. We will hold on any time of injection.     To do next visit:  Return in about 4 months (around 9/10/2024) for bilateral knees.    The above stated was discussed in layman's terms and the patient expressed understanding.  All questions were answered to the patient's satisfaction.       Scribe Attestation      I,:  Siria Glaser am acting as a scribe while in the presence of the attending physician.:       I,:  Neema Glasgow MD personally performed the services described in this documentation    as scribed in my presence.:               Subjective:   Radha Black is a 63 y.o. female who presents today for initial evaluation for bilateral knee pain. She reports that over the last couple of months, she has been having more lateral knee pain bilaterally, going up and down steps and going from a sit to stand.  She does not have any swelling she has a hx of a left knee arthroscopy from 2006, then tried cortisone injection, Visco supplement.      Review of systems negative unless otherwise specified in HPI  Review of Systems   Constitutional:  Negative for chills, fever and  unexpected weight change.   HENT:  Negative for hearing loss, nosebleeds and sore throat.    Eyes:  Negative for pain, redness and visual disturbance.   Respiratory:  Negative for cough, shortness of breath and wheezing.    Cardiovascular:  Negative for chest pain, palpitations and leg swelling.   Gastrointestinal:  Negative for abdominal pain and nausea.   Genitourinary:  Negative for dyspareunia, dysuria and frequency.   Musculoskeletal:  Positive for arthralgias.   Skin:  Negative for rash and wound.   Neurological:  Negative for dizziness, numbness and headaches.   Psychiatric/Behavioral:  Negative for decreased concentration and suicidal ideas. The patient is not nervous/anxious.        Past Medical History:   Diagnosis Date   • Arthritis 1985   • Cancer (HCC) 2018    skin cancer basal cell   • GERD (gastroesophageal reflux disease) 1985    unsure of date   • HL (hearing loss) lifetime    hearing aids in 2021   • Hypertension     5 years   • Shingles 1980’s    a few times over the past many years. the last time was in 2018       Past Surgical History:   Procedure Laterality Date   • COLONOSCOPY     • KNEE SURGERY  2006    arthroscopy left knee 2006   • WISDOM TOOTH EXTRACTION         Family History   Problem Relation Age of Onset   • No Known Problems Mother    • Hypertension Father    • Diabetes Father    • Hearing loss Father    • Breast cancer Maternal Grandmother    • Colon cancer Maternal Grandfather    • Breast cancer Maternal Aunt    • Breast cancer Maternal Aunt        Social History     Occupational History   • Not on file   Tobacco Use   • Smoking status: Never   • Smokeless tobacco: Never   Vaping Use   • Vaping status: Never Used   Substance and Sexual Activity   • Alcohol use: Not Currently     Alcohol/week: 1.0 standard drink of alcohol     Types: 1 Standard drinks or equivalent per week     Comment: rarely   • Drug use: Never   • Sexual activity: Yes     Partners: Male     Birth  control/protection: Post-menopausal         Current Outpatient Medications:   •  FLUoxetine (PROzac) 10 mg capsule, TAKE 1 CAPSULE BY MOUTH EVERY DAY, Disp: 90 capsule, Rfl: 2  •  hydroCHLOROthiazide 25 mg tablet, Take 1 tablet (25 mg total) by mouth daily, Disp: 90 tablet, Rfl: 2  •  meloxicam (MOBIC) 7.5 mg tablet, Take 1 tablet (7.5 mg total) by mouth daily as needed for moderate pain, Disp: 90 tablet, Rfl: 0  •  multivitamin (THERAGRAN) TABS, Take 1 tablet by mouth daily, Disp: , Rfl:   •  omeprazole (PriLOSEC) 40 MG capsule, Take 1 capsule (40 mg total) by mouth daily, Disp: 30 capsule, Rfl: 11    No Known Allergies         Vitals:    05/10/24 1022   BP: 168/93   Pulse: 83       Body mass index is 31.45 kg/m².  Wt Readings from Last 3 Encounters:   05/10/24 85.7 kg (189 lb)   04/08/24 83.9 kg (185 lb)   10/16/23 81.2 kg (179 lb)       Objective:                    Right Knee Exam     Tenderness   The patient is experiencing tenderness in the medial joint line and lateral joint line.    Range of Motion   Extension:  0   Flexion:  120     Tests   Varus: negative Valgus: negative  Drawer:  Anterior - negative    Posterior - negative  Patellar apprehension: negative    Other   Erythema: absent  Sensation: normal  Pulse: present  Swelling: none  Effusion: no effusion present    Comments:  Valgus alignment  Calf is soft and non tender      Left Knee Exam     Tenderness   The patient is experiencing tenderness in the medial joint line and lateral joint line.    Range of Motion   Extension:  0   Flexion:  120     Tests   Varus: negative Valgus: negative  Drawer:  Anterior - negative     Posterior - negative  Patellar apprehension: negative (crepitus but no pain)    Other   Erythema: absent  Sensation: normal  Pulse: present  Swelling: none  Effusion: no effusion present    Comments:  Valgus alignment  Calf is soft and non tender          Diagnostics, reviewed and taken today if performed as documented:    The attending  "physician has personally reviewed the pertinent films in PACS and interpretation is as follows:  X-rays right knee 3 views: Severe lateral compartment, severe patellofemoral arthritis  X-rays left knee 3 views: Moderate to severe lateral compartment arthritis with osteophyte formation and subchondral sclerosing, severe large osteophyte formation patellofemoral arthritis    Procedures, if performed today:    Procedures    None performed      Portions of the record may have been created with voice recognition software.  Occasional wrong word or \"sound a like\" substitutions may have occurred due to the inherent limitations of voice recognition software.  Read the chart carefully and recognize, using context, where substitutions have occurred.  "

## 2024-05-28 DIAGNOSIS — R45.4 IRRITABILITY: ICD-10-CM

## 2024-05-28 DIAGNOSIS — F32.A DEPRESSION, UNSPECIFIED DEPRESSION TYPE: ICD-10-CM

## 2024-05-29 ENCOUNTER — EVALUATION (OUTPATIENT)
Dept: PHYSICAL THERAPY | Age: 64
End: 2024-05-29
Payer: COMMERCIAL

## 2024-05-29 DIAGNOSIS — M17.0 PRIMARY OSTEOARTHRITIS OF BOTH KNEES: Primary | ICD-10-CM

## 2024-05-29 DIAGNOSIS — M62.81 WEAKNESS OF BOTH QUADRICEPS MUSCLES: ICD-10-CM

## 2024-05-29 PROCEDURE — 97110 THERAPEUTIC EXERCISES: CPT | Performed by: PHYSICAL THERAPIST

## 2024-05-29 PROCEDURE — 97162 PT EVAL MOD COMPLEX 30 MIN: CPT | Performed by: PHYSICAL THERAPIST

## 2024-05-29 RX ORDER — FLUOXETINE 10 MG/1
10 CAPSULE ORAL DAILY
Qty: 90 CAPSULE | Refills: 1 | Status: SHIPPED | OUTPATIENT
Start: 2024-05-29

## 2024-05-29 NOTE — PROGRESS NOTES
PT Evaluation     Today's date: 2024  Patient name: Radha Black  : 1960  MRN: [id number]  Referring provider: Neema Glasgow MD  Dx:   Encounter Diagnosis     ICD-10-CM    1. Primary osteoarthritis of both knees  M17.0                      Assessment  Impairments: abnormal gait, abnormal or restricted ROM, abnormal movement, activity intolerance, impaired physical strength, lacks appropriate home exercise program and pain with function    Assessment details: PT IE: 24  Patient reported bilateral knee pain limits transfers, prolonged walking, stair climbing, movement after prolonged static positions, prolonged standing house hold chores are remain limited.  Patient noted she has intermittent bouts of bilateral knee edema.  Patient noted squatting and lifting are very challenging and difficult due to pain and weakness presentation in bilateral knees.  Patient noted she has bilateral knee weakness as well as pain limitations with all standing based activities.  Patient noted intermittent bouts of bilateral knee buckle, but she denies falls.  Patient noted pain aggravation about 2-3 months ago.  Patient noted pain has been reduced with oral Meloxicam.  Patient noted her bilateral knee x-ray's were positive for OA changes and osteophyte presentations.  Patient noted she can recall as young adult she was getting bilateral hip and knee pain.  Patient noted in  she had left knee arthroscopic debridement.  Patient denies current use of spc, but noted she would feel more stable.  Patient noted she has left knee crepitus that has increased in frequency in the last several months.  Patient noted crepitus is typically with sit to stand transfers.  Patient noted she tried a left knee sleeve orthoses which made no effect.  Patient denies knee symptom aggravation with self iadls like dressing.  Patient noted no specific reason, other than prolonged standing and walking as creating left knee buckle  sensation.  Patient noted buckle sensation can effect each knee.  Understanding of Dx/Px/POC: excellent     Prognosis: good  Prognosis details: Patient is a 64 y.o. year old female seen for outpatient PT evaluation with a Primary osteoarthritis of both knees [M17.0] . Patient presents to PT IE with the following problems, concerns, deficits and impairments: bilateral knee pain, decreased bilateral lower extremity range of motion, decreased bilateral lower extremity strength, bilateral knee edema, + TTP gait and stair dysfunctions, transfer dysfunctions, + special tests, functional limitations and decreased tolerance to activity.  Patient would benefit from skilled PT services under the following PT treatment plan to address the above noted deficits: therapeutic exercises and activities to facilitate bilateral lower extremity mobility and strength, modalities, manual therapy techniques, gait and stair training, transfer training, balance and proprioception activities, IASTM techniques, Kinesio taping techniques, and a hep.  Thank you for the referral.     Goals  Short Term goals - 4 weeks  1.  Patient will be independent HEP.   2.  Patient will report a 25 - 50% decrease in pain complaints.  3.  Increase strength 1/2 grade.  4.  Increase ROM 5-10 degrees.    Long Term goals - 8 weeks  1.  Patient will report elimination of pain complaints.  2.  Patient will return to all recreational activities without restriction.  3.  ROM WFL.  4.  Strength 5/5.  5.  Patient will report ability to perform all transfers without bilateral knee symptom aggravation or limitations.  6.  Patient will report ability to perform standing house hold chores and activities without bilateral knee symptom aggravation or limitations.  7.  Patient will report ability to perform stair ascent in a reciprocal pattern without bilateral knee symptom aggravation or limitations.  8.  Patient will report ability to perform squatting and lifting house hold  chores and activities without bilateral knee symptom aggravation or limitations.  9.  Patient will report ability to perform house hold walking functional activities without bilateral knee symptom aggravation or limitations.  10.  Patient will report ability to sleep without bilateral knee symptom aggravation or limitations.    Plan  Patient would benefit from: skilled physical therapy and PT eval  Planned modality interventions: low level laser therapy, manual electrical stimulation, TENS, thermotherapy: hydrocollator packs, ultrasound, unattended electrical stimulation, cryotherapy and electrical stimulation/Russian stimulation    Planned therapy interventions: IASTM, joint mobilization, kinesiology taping, manual therapy, massage, balance, balance/weight bearing training, neuromuscular re-education, postural training, body mechanics training, self care, compression, strengthening, stretching, therapeutic activities, therapeutic exercise, therapeutic training, transfer training, flexibility, functional ROM exercises, gait training, graded activity, graded exercise, graded motor, home exercise program and IADL retraining    Frequency: 2x week  Duration in weeks: 8  Treatment plan discussed with: patient    Subjective Evaluation    History of Present Illness  Mechanism of injury: Patient's PMHx is remarkable for HTN and GERD.       RIGHT KNEE     INDICATION:   Pain in right knee. Pain in left knee.        COMPARISON:  None.     VIEWS:  XR KNEE 3 VW RIGHT NON INJURY      FINDINGS:     There is no acute fracture or dislocation.     There is no joint effusion.     Mild to moderate narrowing of the lateral compartment space with resulting valgus angulation. Large osteophytes of the lateral tibial plateau and femoral epicondyles. Calcific density in the superior patella bursa. Large patellar enthesophytes.   Patellofemoral space is preserved. Enthesophyte of the lateral patella as well.     No lytic or blastic osseous  lesion.     Soft tissues are unremarkable.     IMPRESSION:        Moderate lateral compartment degenerative changes.  Resulting valgus angulation of the knee.  Patellar enthesopathy superior and lateral.  No ardha effusion.  No fracture.    Narrative & Impression        LEFT KNEE     INDICATION:   Pain in right knee. Pain in left knee.      COMPARISON:  None.     VIEWS:  XR KNEE 3 VW LEFT NON INJURY      FINDINGS:     There is no acute fracture or dislocation.     There is no joint effusion.     Medial and lateral compartment spaces are preserved. Osteophytes are noted however the medial lateral tibial plateau and femoral epicondyles. Superior patellar enthesophyte. Patellofemoral space may be narrowed but difficult to assess without sunrise   projection.     No lytic or blastic osseous lesion.     Soft tissues are unremarkable.     IMPRESSION:        Preserved compartment spaces with small marginal osteophyte formation as above.  Patellar enthesopathy.  Possible patellofemoral degenerative narrowing but difficult to assess without the sunrise projection.  No fracture              Patient Goals  Patient goals for therapy: decreased pain, increased motion, improved balance, increased strength, independence with ADLs/IADLs and return to sport/leisure activities  Patient goal: to be able to go for a 15 minute walk without buckle sensation and to feel better  Pain  At best pain ratin  At worst pain ratin  Location: bilateral knees      Objective     Tenderness     Additional Tenderness Details  Patient is + minimal TTP at right lateral knee joint line and minimal TTP at medial and lateral joint line of right knee.    Active Range of Motion   Left Hip   Flexion: 115 degrees   Abduction: 26 degrees     Right Hip   Flexion: 110 degrees with pain  Abduction: 22 degrees   Left Knee   Flexion: 115 degrees   Extension: -8 degrees with pain  Extensor la degrees     Right Knee   Flexion: 114 degrees   Extension: -12  degrees with pain  Extensor la degrees   Left Ankle/Foot   Dorsiflexion (ke): 8 degrees   Plantar flexion: 36 degrees     Right Ankle/Foot   Dorsiflexion (ke): 6 degrees   Plantar flexion: 44 degrees     Additional Active Range of Motion Details  Hamstring mobility on right at 62 degrees and left at 75 degrees.    Strength/Myotome Testing     Left Hip   Planes of Motion   Flexion: 4  Extension: 4+  Abduction: 4+  Adduction: 5    Right Hip   Planes of Motion   Flexion: 4+  Extension: 4+  Abduction: 4+  Adduction: 5    Left Knee   Flexion: 4+  Extension: 4+    Right Knee   Flexion: 4+  Extension: 4    Left Ankle/Foot   Dorsiflexion: 4  Plantar flexion: 4+    Right Ankle/Foot   Dorsiflexion: 5  Plantar flexion: 5    Tests     Left Knee   Positive patellar compression and valgus stress test at 0 degrees.   Negative lateral Starr, medial Starr and varus stress test at 0 degrees.     Right Knee   Positive patellar compression.   Negative lateral Starr, medial Starr, valgus stress test at 0 degrees and varus stress test at 0 degrees.     Ambulation     Ambulation: Level Surfaces   Ambulation without assistive device: independent    Additional Level Surfaces Ambulation Details  Patient ambulates with decrease in pace, decrease in left stance versus right stance phase, decrease in bilateral step length, and decrease in bilateral knee extension with mid stance.    Ambulation: Stairs   Ascend stairs: independent  Pattern: non-reciprocal  Railings: two rails  Descend stairs: independent  Pattern: non-reciprocal  Railings: two rails    Comments   PT IE: 24.  Tug is at 10.83 seconds.    General Comments:      Knee Comments  Girth Measurements:  Knee:  Suprapatellar region: Right at 40.8 CM and left at 39.8 CM;  Mid patellar region: Right at 39.8 CM and left at 37.8 Cm;  Infrapatellar region: Right at 37.7 CM and left at 36.9 Cm.           Precautions: Patient's PMHx is remarkable for GERD and HTN.    Access  "Code: [email or web address]  URL: [email or web address]  Date: 05/29/2024  Prepared by: Jerry Miranda    Exercises  - Supine Quad Set  - 1 x daily - 7 x weekly - 2 sets - 10 reps - 5 seconds hold  - Supine Active Straight Leg Raise  - 1 x daily - 7 x weekly - 2 sets - 10 reps  - Supine Heel Slide  - 1 x daily - 7 x weekly - 2 sets - 10 reps  - Seated Long Arc Quad  - 1 x daily - 7 x weekly - 2 sets - 10 reps      Manuals 5/29            Kinesio taping to bilateral knees in a \"U\" pattern with base of \"U\" at the patellar tendon and tails at medial and lateral patellar region at 50-75% tension 10 min                                                   Neuro Re-Ed                                                                                                        Ther Ex                          Nu step                          Seated hr and tr:B:             LAQ:B: Hep instruction            Seated hip flexion:B:                                       Hamstring stretch:B:             Quad sets:B: Hep instruction:B:            Slr flexion:B: Hep instruction            Heel slides:B: Hep instruction            SAQ:B:             Bridges              Side lying hip abduction:B:             Prone TKE:B:             Prone hip extension:B:             Prone knee flexion self stretch:B:             Prone knee flexion arom:B:                          Standing hr and tr:B:             Standing slr x 3:B:             Standing hamstring curls:B:             Mini squats             Lunges on foam:B:             SLS and tandem stance:B:             Side stepping and tandem walking             Step ups:B: 6\"            Step downs:B: 4\"            Lateral step ups:B: 6\"                                                                Ther Activity                                       Gait Training                                       Modalities                          MHP to bilateral knee with small bolster prn                     "

## 2024-06-07 ENCOUNTER — OFFICE VISIT (OUTPATIENT)
Dept: PHYSICAL THERAPY | Age: 64
End: 2024-06-07
Payer: COMMERCIAL

## 2024-06-07 DIAGNOSIS — M62.81 WEAKNESS OF BOTH QUADRICEPS MUSCLES: ICD-10-CM

## 2024-06-07 DIAGNOSIS — M17.0 PRIMARY OSTEOARTHRITIS OF BOTH KNEES: Primary | ICD-10-CM

## 2024-06-07 PROCEDURE — 97110 THERAPEUTIC EXERCISES: CPT | Performed by: PHYSICAL THERAPIST

## 2024-06-07 PROCEDURE — 97530 THERAPEUTIC ACTIVITIES: CPT | Performed by: PHYSICAL THERAPIST

## 2024-06-07 NOTE — PROGRESS NOTES
"Daily Note     Today's date: 2024  Patient name: Radha Black  : 1960  MRN: [id number]  Referring provider: Neeam Glasgow MD  Dx:   Encounter Diagnosis     ICD-10-CM    1. Primary osteoarthritis of both knees  M17.0       2. Weakness of both quadriceps muscles  M62.81                      Subjective: patient reports relief with taping of bilateral knees last visit.      Objective: See treatment diary below      Assessment: Tolerated treatment well. Initiated program as noted below. Patient demonstrated fatigue post treatment, exhibited good technique with therapeutic exercises, and would benefit from continued PT      Plan: Continue per plan of care.  Progress treatment as tolerated.       Precautions: Patient's PMHx is remarkable for GERD and HTN.    Access Code: [email or web address]  URL: [email or web address]  Date: 2024  Prepared by: Jerry Miranda    Exercises  - Supine Quad Set  - 1 x daily - 7 x weekly - 2 sets - 10 reps - 5 seconds hold  - Supine Active Straight Leg Raise  - 1 x daily - 7 x weekly - 2 sets - 10 reps  - Supine Heel Slide  - 1 x daily - 7 x weekly - 2 sets - 10 reps  - Seated Long Arc Quad  - 1 x daily - 7 x weekly - 2 sets - 10 reps      Manuals            Kinesio taping to bilateral knees in a \"U\" pattern with base of \"U\" at the patellar tendon and tails at medial and lateral patellar region at 50-75% tension 10 min 8'                                                  Neuro Re-Ed                                                                                                        Ther Ex                          Nu step  5'                        Seated hr and tr:B:  20x           LAQ:B: Hep instruction 20x           Seated hip flexion:B:  20x                                     Hamstring stretch:B:  20\"x5           Quad sets:B: Hep instruction:B: 5\" x15           Slr flexion:B: Hep instruction 2x10 ea           Heel slides:B: Hep instruction          " "  SAQ:B:             Bridges   2x10           Side lying hip abduction:B:             Prone TKE:B:             Prone hip extension:B:             Prone knee flexion self stretch:B:  20\"x5           Prone knee flexion arom:B:                          Standing hr and tr:B:             Standing slr x 3:B:             Standing hamstring curls:B:             Mini squats             Lunges on foam:B:               SLS and tandem stance:B:             Side stepping and tandem walking             Step ups:B: 6\" 6\" 2x10           Step downs:B: 4\" 4\" 2x10           Lateral step ups:B: 6\" 6\" 2x10                                                               Ther Activity                                       Gait Training                                       Modalities                          MHP to bilateral knee with small bolster prn                               "

## 2024-06-14 ENCOUNTER — OFFICE VISIT (OUTPATIENT)
Dept: PHYSICAL THERAPY | Age: 64
End: 2024-06-14
Payer: COMMERCIAL

## 2024-06-14 DIAGNOSIS — M17.0 PRIMARY OSTEOARTHRITIS OF BOTH KNEES: Primary | ICD-10-CM

## 2024-06-14 DIAGNOSIS — M62.81 WEAKNESS OF BOTH QUADRICEPS MUSCLES: ICD-10-CM

## 2024-06-14 PROCEDURE — 97140 MANUAL THERAPY 1/> REGIONS: CPT | Performed by: PHYSICAL THERAPIST

## 2024-06-14 PROCEDURE — 97110 THERAPEUTIC EXERCISES: CPT | Performed by: PHYSICAL THERAPIST

## 2024-06-14 NOTE — PROGRESS NOTES
"Daily Note     Today's date: 2024  Patient name: Radha Black  : 1960  MRN: [id number]  Referring provider: Neema Glasgow MD  Dx:   Encounter Diagnosis     ICD-10-CM    1. Primary osteoarthritis of both knees  M17.0       2. Weakness of both quadriceps muscles  M62.81                      Subjective: Patient noted she feels the kinesio taping is effective in short term pain reduction.  Patient noted she has minimal to moderate bilateral knee and right distal ITB region pain.      Objective: See treatment diary below      Assessment: Tolerated treatment well. Patient demonstrated fatigue post treatment, exhibited good technique with therapeutic exercises, and would benefit from continued PT.  Patient presents with use of IASTM and kinesio taping techniques creating more effective short term pain reduction effect versus kinesio taping only.  But, she lacks long term bilateral knee pain reduction that continues to limit prolonged weight baring based functional activities.  Thus, PT is warranted to facilitate bilateral knee pain reduction, mobility and strength progress to improve all standing based functional activities.      Plan: Continue per plan of care.  Progress treatment as tolerated.       Precautions: Patient's PMHx is remarkable for GERD and HTN.    Access Code: [email or web address]  URL: [email or web address]  Date: 2024  Prepared by: Jerry Miranda    Exercises  - Supine Quad Set  - 1 x daily - 7 x weekly - 2 sets - 10 reps - 5 seconds hold  - Supine Active Straight Leg Raise  - 1 x daily - 7 x weekly - 2 sets - 10 reps  - Supine Heel Slide  - 1 x daily - 7 x weekly - 2 sets - 10 reps  - Seated Long Arc Quad  - 1 x daily - 7 x weekly - 2 sets - 10 reps      Manuals           Kinesio taping to bilateral knees in a \"U\" pattern with base of \"U\" at the patellar tendon and tails at medial and lateral patellar region at 50-75% tension 10 min 8' 15 min total                 " "      IASTM to right quad and patellar tendons and distal ITB   15 min                       Neuro Re-Ed                                                                                                        Ther Ex                          Nu step  5' 6 min                       Seated hr and tr:B:  20x 2 x 10          LAQ:B: Hep instruction 20x 2 x 10          Seated hip flexion:B:  20x 2 x 10                                    Hamstring stretch:B:  20\"x5 20 sec x 5          Quad sets:B: Hep instruction:B: 5\" x15 NT          Slr flexion:B: Hep instruction 2x10 ea 2 x 10          Heel slides:B: Hep instruction  2 x 10          SAQ:B:   2 x 10          Bridges   2x10 2 x 10          Side lying hip abduction:B:             Prone TKE:B:             Prone hip extension:B:             Prone knee flexion self stretch:B:  20\"x5           Prone knee flexion arom:B:                          Standing hr and tr:B:             Standing slr x 3:B:             Standing hamstring curls:B:             Mini squats             Lunges on foam:B:               SLS and tandem stance:B:             Side stepping and tandem walking             Step ups:B: 6\" 6\" 2x10           Step downs:B: 4\" 4\" 2x10           Lateral step ups:B: 6\" 6\" 2x10                                                               Ther Activity                                       Gait Training                                       Modalities                          MHP to bilateral knee with small bolster prn   NT                            "

## 2024-06-18 ENCOUNTER — OFFICE VISIT (OUTPATIENT)
Dept: PHYSICAL THERAPY | Age: 64
End: 2024-06-18
Payer: COMMERCIAL

## 2024-06-18 DIAGNOSIS — M17.0 PRIMARY OSTEOARTHRITIS OF BOTH KNEES: Primary | ICD-10-CM

## 2024-06-18 DIAGNOSIS — M62.81 WEAKNESS OF BOTH QUADRICEPS MUSCLES: ICD-10-CM

## 2024-06-18 PROCEDURE — 97110 THERAPEUTIC EXERCISES: CPT

## 2024-06-18 NOTE — PROGRESS NOTES
"Daily Note     Today's date: 2024  Patient name: Radha Black  : 1960  MRN: [id number]  Referring provider: Neema Glasgow MD  Dx:   Encounter Diagnosis     ICD-10-CM    1. Primary osteoarthritis of both knees  M17.0       2. Weakness of both quadriceps muscles  M62.81                      Subjective: Patient noted she feels the kinesio taping is effective in short term pain reduction with pain reduction to a 1 of 10.        Objective: See treatment diary below      Assessment: Muscle soreness noted after today's session. Progress as able       Plan: Continue per plan of care.  Progress treatment as tolerated.       Precautions: Patient's PMHx is remarkable for GERD and HTN.    Access Code: [email or web address]  URL: [email or web address]  Date: 2024  Prepared by: Jerry Miranda    Exercises  - Supine Quad Set  - 1 x daily - 7 x weekly - 2 sets - 10 reps - 5 seconds hold  - Supine Active Straight Leg Raise  - 1 x daily - 7 x weekly - 2 sets - 10 reps  - Supine Heel Slide  - 1 x daily - 7 x weekly - 2 sets - 10 reps  - Seated Long Arc Quad  - 1 x daily - 7 x weekly - 2 sets - 10 reps      Manuals          Kinesio taping to bilateral knees in a \"U\" pattern with base of \"U\" at the patellar tendon and tails at medial and lateral patellar region at 50-75% tension 10 min 8' 15 min total EVA                       IASTM to right quad and patellar tendons and distal ITB   15 min 10 MIN                      Neuro Re-Ed                                       Ther Ex                          Nu step  5' 6 min 10 min                       Seated hr and tr:B:  20x 2 x 10 NT         LAQ:B: Hep instruction 20x 2 x 10 20x         Seated hip flexion:B:  20x 2 x 10 20x                                    Hamstring stretch:B:  20\"x5 20 sec x 5 20SEC 5X          Quad sets:B: Hep instruction:B: 5\" x15 NT NT         Slr flexion:B: Hep instruction 2x10 ea 2 x 10 20X          Heel slides:B: Hep " "instruction  2 x 10 20X          SAQ:B:   2 x 10 20X 3SEC          Bridges   2x10 2 x 10 20X 2SEC          Side lying hip abduction:B:   20x  20X          Prone TKE:B:   NT NT         Prone hip extension:B:   NT NT         Prone knee flexion self stretch:B:  20\"x5 20SEC 5X  20SEC 5X          Prone knee flexion arom:B:   20X  20X                       Standing hr and tr:B:   NT NT         Standing slr x 3:B:   NT NT         Standing hamstring curls:B:   NT NT         Mini squats   20X  20X          Lunges on foam:B:     NT NT         SLS and tandem stance:B:   NT NT         Side stepping and tandem walking   NT NT         Step ups:B: 6\" 6\" 2x10 NT NT         Step downs:B: 4\" 4\" 2x10 NT NT         Lateral step ups:B: 6\" 6\" 2x10 NT NT                                                             Ther Activity                                       Gait Training                                       Modalities                          MHP to bilateral knee with small bolster prn   NT                            "

## 2024-06-21 ENCOUNTER — OFFICE VISIT (OUTPATIENT)
Dept: PHYSICAL THERAPY | Age: 64
End: 2024-06-21
Payer: COMMERCIAL

## 2024-06-21 DIAGNOSIS — M62.81 WEAKNESS OF BOTH QUADRICEPS MUSCLES: ICD-10-CM

## 2024-06-21 DIAGNOSIS — M17.0 PRIMARY OSTEOARTHRITIS OF BOTH KNEES: Primary | ICD-10-CM

## 2024-06-21 PROCEDURE — 97110 THERAPEUTIC EXERCISES: CPT

## 2024-06-21 NOTE — PROGRESS NOTES
"Daily Note     Today's date: 2024  Patient name: Radha Black  : 1960  MRN: [id number]  Referring provider: Neema Glasgow MD  Dx:   Encounter Diagnosis     ICD-10-CM    1. Primary osteoarthritis of both knees  M17.0       2. Weakness of both quadriceps muscles  M62.81                      Subjective: Patient states she is having less pain overall and kinesio tape and home exercises. Some ITB soreness noted.       Objective: See treatment diary below      Assessment: Patient tolerated session well, she would continue to benefit from PT for more LE strengthening and conditioning. B ITB's very tender with touch today. Possibly consider adding ITB stretches next session to HEP. Kinesio taping continues to help as well.       Plan: Continue per plan of care.  Progress treatment as tolerated.       Precautions: Patient's PMHx is remarkable for GERD and HTN.    Access Code: [email or web address]  URL: [email or web address]  Date: 2024  Prepared by: Jerry Miranda    Exercises  - Supine Quad Set  - 1 x daily - 7 x weekly - 2 sets - 10 reps - 5 seconds hold  - Supine Active Straight Leg Raise  - 1 x daily - 7 x weekly - 2 sets - 10 reps  - Supine Heel Slide  - 1 x daily - 7 x weekly - 2 sets - 10 reps  - Seated Long Arc Quad  - 1 x daily - 7 x weekly - 2 sets - 10 reps      Manuals         Kinesio taping to bilateral knees in a \"U\" pattern with base of \"U\" at the patellar tendon and tails at medial and lateral patellar region at 50-75% tension 10 min 8' 15 min total EVA  GF                     IASTM to right quad and patellar tendons and distal ITB   15 min 10 MIN X 10 min                      Neuro Re-Ed                                       Ther Ex                          Nu step  5' 6 min 10 min  X 10 min                      Seated hr and tr:B:  20x 2 x 10 NT 2 x 10 B3 x 10         LAQ:B: Hep instruction 20x 2 x 10 20x 3 x 10 B        Seated hip flexion:B:  20x 2 x 10 20x " " 3 x 10 B                                  Hamstring stretch:B:  20\"x5 20 sec x 5 20SEC 5X  20 sec x 5 B        Quad sets:B: Hep instruction:B: 5\" x15 NT NT HEP        Slr flexion:B: Hep instruction 2x10 ea 2 x 10 20X  2 x 10 B        Heel slides:B: Hep instruction  2 x 10 20X  2 x 10         SAQ:B:   2 x 10 20X 3SEC  2 x 10  3 sec         Bridges   2x10 2 x 10 20X 2SEC  2 sec x 20         Side lying hip abduction:B:   20x  20X  X 20         Prone TKE:B:   NT NT         Prone hip extension:B:   NT NT         Prone knee flexion self stretch:B:  20\"x5 20SEC 5X  20SEC 5X  20 sec  5        Prone knee flexion arom:B:   20X  20X  X 20                      Standing hr and tr:B:   NT NT         Standing slr x 3:B:   NT NT         Standing hamstring curls:B:   NT NT         Mini squats   20X  20X  X 20         Lunges on foam:B:     NT NT         SLS and tandem stance:B:   NT NT         Side stepping and tandem walking   NT NT         Step ups:B: 6\" 6\" 2x10 NT NT         Step downs:B: 4\" 4\" 2x10 NT NT         Lateral step ups:B: 6\" 6\" 2x10 NT NT                                                             Ther Activity                                       Gait Training                                       Modalities                          MHP to bilateral knee with small bolster prn   NT                            "

## 2024-06-25 ENCOUNTER — OFFICE VISIT (OUTPATIENT)
Dept: PHYSICAL THERAPY | Age: 64
End: 2024-06-25
Payer: COMMERCIAL

## 2024-06-25 DIAGNOSIS — M17.0 PRIMARY OSTEOARTHRITIS OF BOTH KNEES: Primary | ICD-10-CM

## 2024-06-25 DIAGNOSIS — M62.81 WEAKNESS OF BOTH QUADRICEPS MUSCLES: ICD-10-CM

## 2024-06-25 PROCEDURE — 97140 MANUAL THERAPY 1/> REGIONS: CPT

## 2024-06-25 PROCEDURE — 97110 THERAPEUTIC EXERCISES: CPT

## 2024-06-25 NOTE — PROGRESS NOTES
"Daily Note     Today's date: 2024  Patient name: Radha Black  : 1960  MRN: [id number]  Referring provider: Neema Glasgow MD  Dx:   Encounter Diagnosis     ICD-10-CM    1. Primary osteoarthritis of both knees  M17.0       2. Weakness of both quadriceps muscles  M62.81                      Subjective: both knees have been feeling good since LV. Feels less restriction at the ITB.       Objective: See treatment diary below      Assessment: Tolerated treatment well. Radha continues to respond well to manual interventions and support with K-tape. Knee valgus collapse noted bilat with mini squats, less with STS and t-band abd iso. She would benefit from continued PT services to promote BLE strength and mobility, as she demonstrated deficits in these areas.       Plan: Continue per plan of care.       Precautions: Patient's PMHx is remarkable for GERD and HTN.    Access Code: [email or web address]  URL: [email or web address]  Date: 2024  Prepared by: Jerry Miranda    Exercises  - Supine Quad Set  - 1 x daily - 7 x weekly - 2 sets - 10 reps - 5 seconds hold  - Supine Active Straight Leg Raise  - 1 x daily - 7 x weekly - 2 sets - 10 reps  - Supine Heel Slide  - 1 x daily - 7 x weekly - 2 sets - 10 reps  - Seated Long Arc Quad  - 1 x daily - 7 x weekly - 2 sets - 10 reps      Manuals        Kinesio taping to bilateral knees in a \"U\" pattern with base of \"U\" at the patellar tendon and tails at medial and lateral patellar region at 50-75% tension 10 min 8' 15 min total EVA  GF MB                    IASTM to right quad and patellar tendons and distal ITB   15 min 10 MIN X 10 min  MB                    Neuro Re-Ed                                       Ther Ex                          Nu step  5' 6 min 10 min  X 10 min  X 10 min                     Seated hr and tr:B:  20x 2 x 10 NT 2 x 10 B3 x 10  2x10 standing       LAQ:B: Hep instruction 20x 2 x 10 20x 3 x 10 B 3x10 b     " "  Seated hip flexion:B:  20x 2 x 10 20x  3 x 10 B 2x10 standing                                 Hamstring stretch:B:  20\"x5 20 sec x 5 20SEC 5X  20 sec x 5 B 20 sec x 5 B       Quad sets:B: Hep instruction:B: 5\" x15 NT NT HEP HEP       Slr flexion:B: Hep instruction 2x10 ea 2 x 10 20X  2 x 10 B 2x10 b       Heel slides:B: Hep instruction  2 x 10 20X  2 x 10  HEP       SAQ:B:   2 x 10 20X 3SEC  2 x 10  3 sec  2 x 10  3 sec        Bridges   2x10 2 x 10 20X 2SEC  2 sec x 20  2 sec x 20        Side lying hip abduction:B:   20x  20X  X 20  20x       Prone TKE:B:   NT NT         Prone hip extension:B:   NT NT         Prone knee flexion self stretch:B:  20\"x5 20SEC 5X  20SEC 5X  20 sec  5 20 sec  5       Prone knee flexion arom:B:   20X  20X  X 20  20x       ITB stretching in supine      4x20 sec       Standing hr and tr:B:   NT NT         Standing slr x 3:B:   NT NT         Standing hamstring curls:B:   NT NT         Mini squats   20X  20X  X 20  20x       Lunges on foam:B:     NT NT         SLS and tandem stance:B:   NT NT         Side stepping and tandem walking   NT NT         Step ups:B: 6\" 6\" 2x10 NT NT         Step downs:B: 4\" 4\" 2x10 NT NT         Lateral step ups:B: 6\" 6\" 2x10 NT NT                                                             Ther Activity                                       Gait Training                                       Modalities                          MHP to bilateral knee with small bolster prn   NT                              "

## 2024-06-28 ENCOUNTER — OFFICE VISIT (OUTPATIENT)
Dept: PHYSICAL THERAPY | Age: 64
End: 2024-06-28
Payer: COMMERCIAL

## 2024-06-28 DIAGNOSIS — M17.0 PRIMARY OSTEOARTHRITIS OF BOTH KNEES: Primary | ICD-10-CM

## 2024-06-28 DIAGNOSIS — M62.81 WEAKNESS OF BOTH QUADRICEPS MUSCLES: ICD-10-CM

## 2024-06-28 PROCEDURE — 97110 THERAPEUTIC EXERCISES: CPT | Performed by: PHYSICAL THERAPIST

## 2024-06-28 PROCEDURE — 97140 MANUAL THERAPY 1/> REGIONS: CPT | Performed by: PHYSICAL THERAPIST

## 2024-06-28 NOTE — PROGRESS NOTES
"PT Evaluation  / PT Reassessment / PT Discharge    Today's date: 2024  Patient name: Radha Black  : 1960  MRN: [id number]  Referring provider: Neema Glasgow MD  Dx:   Encounter Diagnosis     ICD-10-CM    1. Primary osteoarthritis of both knees  M17.0       2. Weakness of both quadriceps muscles  M62.81                      Assessment  Impairments: abnormal gait, abnormal or restricted ROM, abnormal movement, activity intolerance, impaired physical strength, lacks appropriate home exercise program and pain with function    Assessment details: PT Reassessment: 24.  Patient reported the following progress since onset of PT:  \"everything\" with specifics of stair better, transfers improved, bilateral lower extremity mobility and pain reduction.  Patient noted the following deficits that still persists: intermittent bilateral knee pain.  Patient noted she is able to use hep to address pain and tightness symptoms.      PT IE: 24  Patient reported bilateral knee pain limits transfers, prolonged walking, stair climbing, movement after prolonged static positions, prolonged standing house hold chores are remain limited.  Patient noted she has intermittent bouts of bilateral knee edema.  Patient noted squatting and lifting are very challenging and difficult due to pain and weakness presentation in bilateral knees.  Patient noted she has bilateral knee weakness as well as pain limitations with all standing based activities.  Patient noted intermittent bouts of bilateral knee buckle, but she denies falls.  Patient noted pain aggravation about 2-3 months ago.  Patient noted pain has been reduced with oral Meloxicam.  Patient noted her bilateral knee x-ray's were positive for OA changes and osteophyte presentations.  Patient noted she can recall as young adult she was getting bilateral hip and knee pain.  Patient noted in  she had left knee arthroscopic debridement.  Patient denies current use of " spc, but noted she would feel more stable.  Patient noted she has left knee crepitus that has increased in frequency in the last several months.  Patient noted crepitus is typically with sit to stand transfers.  Patient noted she tried a left knee sleeve orthoses which made no effect.  Patient denies knee symptom aggravation with self iadls like dressing.  Patient noted no specific reason, other than prolonged standing and walking as creating left knee buckle sensation.  Patient noted buckle sensation can effect each knee.  Understanding of Dx/Px/POC: excellent     Prognosis: good  Prognosis details: Patient is a 64 y.o. year old female seen for outpatient PT reevaluation with a Primary osteoarthritis of both knees [M17.0] . Patient presents to PT reassessment with the following progress: decrease in bilateral knee pain, increase in bilateral knee mobility and strength, decrease in TTP, gait and stair improvements and functional progress.  Thus, due to functional and impairment progress patient agrees with PT POC to d/c to hep.  Thank you for the referral.     Goals  Short Term goals - 4 weeks  1.  Patient will be independent HEP. MET.  2.  Patient will report a 25 - 50% decrease in pain complaints.Met.  3.  Increase strength 1/2 grade.  MET.  4.  Increase ROM 5-10 degrees.  MET.    Long Term goals - 8 weeks  1.  Patient will report elimination of pain complaints.  Partially MET.  2.  Patient will return to all recreational activities without restriction.Partially MET.  3.  ROM WFL.  MET.  4.  Strength 5/5.  Partially MET.  5.  Patient will report ability to perform all transfers without bilateral knee symptom aggravation or limitations.Partially MET.  6.  Patient will report ability to perform standing house hold chores and activities without bilateral knee symptom aggravation or limitations.Partially MET.  7.  Patient will report ability to perform stair ascent in a reciprocal pattern without bilateral knee  symptom aggravation or limitations.Partially MET.  8.  Patient will report ability to perform squatting and lifting house hold chores and activities without bilateral knee symptom aggravation or limitations.Partially MET.  9.  Patient will report ability to perform house hold walking functional activities without bilateral knee symptom aggravation or limitations.Partially MET.  10.  Patient will report ability to sleep without bilateral knee symptom aggravation or limitations.Partially MET.    Plan  Patient would benefit from: skilled physical therapy and PT eval  Planned modality interventions: low level laser therapy, manual electrical stimulation, TENS, thermotherapy: hydrocollator packs, ultrasound, unattended electrical stimulation, cryotherapy and electrical stimulation/Russian stimulation    Planned therapy interventions: IASTM, joint mobilization, kinesiology taping, manual therapy, massage, balance, balance/weight bearing training, neuromuscular re-education, postural training, body mechanics training, self care, compression, strengthening, stretching, therapeutic activities, therapeutic exercise, therapeutic training, transfer training, flexibility, functional ROM exercises, gait training, graded activity, graded exercise, graded motor, home exercise program and IADL retraining    Frequency: 2x week  Duration in weeks: 8  Treatment plan discussed with: patient      Subjective Evaluation    History of Present Illness  Mechanism of injury: Patient's PMHx is remarkable for HTN and GERD.       RIGHT KNEE     INDICATION:   Pain in right knee. Pain in left knee.        COMPARISON:  None.     VIEWS:  XR KNEE 3 VW RIGHT NON INJURY      FINDINGS:     There is no acute fracture or dislocation.     There is no joint effusion.     Mild to moderate narrowing of the lateral compartment space with resulting valgus angulation. Large osteophytes of the lateral tibial plateau and femoral epicondyles. Calcific density in the  superior patella bursa. Large patellar enthesophytes.   Patellofemoral space is preserved. Enthesophyte of the lateral patella as well.     No lytic or blastic osseous lesion.     Soft tissues are unremarkable.     IMPRESSION:        Moderate lateral compartment degenerative changes.  Resulting valgus angulation of the knee.  Patellar enthesopathy superior and lateral.  No radha effusion.  No fracture.    Narrative & Impression        LEFT KNEE     INDICATION:   Pain in right knee. Pain in left knee.      COMPARISON:  None.     VIEWS:  XR KNEE 3 VW LEFT NON INJURY      FINDINGS:     There is no acute fracture or dislocation.     There is no joint effusion.     Medial and lateral compartment spaces are preserved. Osteophytes are noted however the medial lateral tibial plateau and femoral epicondyles. Superior patellar enthesophyte. Patellofemoral space may be narrowed but difficult to assess without sunrise   projection.     No lytic or blastic osseous lesion.     Soft tissues are unremarkable.     IMPRESSION:        Preserved compartment spaces with small marginal osteophyte formation as above.  Patellar enthesopathy.  Possible patellofemoral degenerative narrowing but difficult to assess without the sunrise projection.  No fracture              Patient Goals  Patient goals for therapy: decreased pain, increased motion, improved balance, increased strength, independence with ADLs/IADLs and return to sport/leisure activities  Patient goal: to be able to go for a 15 minute walk without buckle sensation and to feel better  Pain  At best pain ratin  At worst pain ratin  Location: bilateral knees        Objective     Tenderness     Additional Tenderness Details  Patient is - TTP at right knee.    Active Range of Motion   Left Hip   Flexion: 115 degrees   Abduction: 26 degrees     Right Hip   Flexion: 110 degrees with pain  Abduction: 22 degrees   Left Knee   Flexion: 116 degrees   Extension: -5 degrees with  pain  Extensor lag: 10 degrees     Right Knee   Flexion: 115 degrees   Extension: -8 degrees with pain  Extensor la degrees   Left Ankle/Foot   Dorsiflexion (ke): 8 degrees   Plantar flexion: 36 degrees     Right Ankle/Foot   Dorsiflexion (ke): 6 degrees   Plantar flexion: 44 degrees     Additional Active Range of Motion Details  Hamstring mobility on right at 62 degrees and left at 75 degrees.    Strength/Myotome Testing     Left Hip   Planes of Motion   Flexion: 4+  Extension: 4+  Abduction: 4+  Adduction: 5    Right Hip   Planes of Motion   Flexion: 4+  Extension: 4+  Abduction: 4+  Adduction: 5    Left Knee   Flexion: 4+  Extension: 4+    Right Knee   Flexion: 4+  Extension: 4+    Left Ankle/Foot   Dorsiflexion: 4+  Plantar flexion: 5    Right Ankle/Foot   Dorsiflexion: 5  Plantar flexion: 5    Tests     Left Knee   Positive patellar compression and valgus stress test at 0 degrees.   Negative lateral Starr, medial Starr and varus stress test at 0 degrees.     Right Knee   Positive patellar compression.   Negative lateral Starr, medial Starr, valgus stress test at 0 degrees and varus stress test at 0 degrees.     Ambulation     Ambulation: Level Surfaces   Ambulation without assistive device: independent    Additional Level Surfaces Ambulation Details  Patient ambulates with decrease in left stance versus right stance phase, decrease in bilateral step length, and decrease in bilateral knee extension with mid stance.    Ambulation: Stairs   Ascend stairs: independent  Pattern: reciprocal  Railings: two rails  Descend stairs: independent  Pattern: non-reciprocal  Railings: two rails    Comments   PT IE: 24.  Tug is at 10.83 seconds.    General Comments:      Knee Comments  Girth Measurements:  Knee:  Suprapatellar region: Right at 40.8 CM and left at 39.8 CM;  Mid patellar region: Right at 39.8 CM and left at 37.8 Cm;  Infrapatellar region: Right at 37.7 CM and left at 36.9 Cm.            "  Precautions: Patient's PMHx is remarkable for GERD and HTN.    Access Code: [email or web address]  URL: [email or web address]  Date: 05/29/2024  Prepared by: Jerry Miranda    Exercises  - Supine Quad Set  - 1 x daily - 7 x weekly - 2 sets - 10 reps - 5 seconds hold  - Supine Active Straight Leg Raise  - 1 x daily - 7 x weekly - 2 sets - 10 reps  - Supine Heel Slide  - 1 x daily - 7 x weekly - 2 sets - 10 reps  - Seated Long Arc Quad  - 1 x daily - 7 x weekly - 2 sets - 10 reps        Access Code: [email or web address]  URL: [email or web address]  Date: 06/28/2024  Prepared by: Jerry Miranda    Exercises  - Standing Heel Raise  - 1 x daily - 7 x weekly - 3 sets - 10 reps  - Standing Heel Raise with Support  - 1 x daily - 7 x weekly - 3 sets - 10 reps  - Supine Bridge  - 1 x daily - 7 x weekly - 3 sets - 10 reps  - Single Leg Stance  - 1 x daily - 7 x weekly - 3 sets - 10 reps  - Standing Hip Abduction with Counter Support  - 1 x daily - 7 x weekly - 3 sets - 10 reps  - Standing March with Counter Support  - 1 x daily - 7 x weekly - 3 sets - 10 reps  - Standing Hip Extension with Counter Support  - 1 x daily - 7 x weekly - 3 sets - 10 reps  - Supine Piriformis Stretch with Foot on Ground  - 1 x daily - 7 x weekly - 3 sets - 10 reps  - Mini Squat with Counter Support  - 1 x daily - 7 x weekly - 3 sets - 10 reps    Manuals 5/29 6/7 6/14 6/18 6/21 6/25 6/28      Kinesio taping to bilateral knees in a \"U\" pattern with base of \"U\" at the patellar tendon and tails at medial and lateral patellar region at 50-75% tension 10 min 8' 15 min total EVA  GF MB CAD                   IASTM to right quad and patellar tendons and distal ITB   15 min 10 MIN X 10 min  MB NT                   Neuro Re-Ed                                       Ther Ex                          Nu step  5' 6 min 10 min  X 10 min  X 10 min  10 min                   Seated hr and tr:B:  20x 2 x 10 NT 2 x 10 B3 x 10  2x10 standing NT      LAQ:B: Hep " "instruction 20x 2 x 10 20x 3 x 10 B 3x10 b 2 x 10      Seated hip flexion:B:  20x 2 x 10 20x  3 x 10 B 2x10 standing 2 x 10                                Hamstring stretch:B:  20\"x5 20 sec x 5 20SEC 5X  20 sec x 5 B 20 sec x 5 B 20 sec x 5      Quad sets:B: Hep instruction:B: 5\" x15 NT NT HEP HEP NT      Slr flexion:B: Hep instruction 2x10 ea 2 x 10 20X  2 x 10 B 2x10 b 2 x 10      Heel slides:B: Hep instruction  2 x 10 20X  2 x 10  HEP NT      SAQ:B:   2 x 10 20X 3SEC  2 x 10  3 sec  2 x 10  3 sec  3 sec x 20      Bridges   2x10 2 x 10 20X 2SEC  2 sec x 20  2 sec x 20  3 sec x 20      Side lying hip abduction:B:   20x  20X  X 20  20x 2 x 10      Prone TKE:B:   NT NT         Prone hip extension:B:   NT NT         Prone knee flexion self stretch:B:  20\"x5 20SEC 5X  20SEC 5X  20 sec  5 20 sec  5 NT      Prone knee flexion arom:B:   20X  20X  X 20  20x 2 x 10      ITB stretching in supine      4x20 sec NT      Standing hr and tr:B:   NT NT   10 x       Standing slr x 3:B:   NT NT   10 x      Standing hamstring curls:B:   NT NT         Mini squats   20X  20X  X 20  20x 2 x 10      Lunges on foam:B:     NT NT         SLS and tandem stance:B:   NT NT         Side stepping and tandem walking   NT NT         Step ups:B: 6\" 6\" 2x10 NT NT         Step downs:B: 4\" 4\" 2x10 NT NT         Lateral step ups:B: 6\" 6\" 2x10 NT NT                                                             Ther Activity                                       Gait Training                                       Modalities                          MHP to bilateral knee with small bolster prn   NT                           "

## 2024-07-02 DIAGNOSIS — M17.0 PRIMARY OSTEOARTHRITIS OF BOTH KNEES: ICD-10-CM

## 2024-07-02 DIAGNOSIS — K21.9 GASTROESOPHAGEAL REFLUX DISEASE, UNSPECIFIED WHETHER ESOPHAGITIS PRESENT: ICD-10-CM

## 2024-07-02 RX ORDER — OMEPRAZOLE 40 MG/1
40 CAPSULE, DELAYED RELEASE ORAL DAILY
Qty: 100 CAPSULE | Refills: 1 | Status: SHIPPED | OUTPATIENT
Start: 2024-07-02 | End: 2025-01-18

## 2024-07-02 RX ORDER — MELOXICAM 7.5 MG/1
7.5 TABLET ORAL DAILY PRN
Qty: 90 TABLET | Refills: 0 | Status: SHIPPED | OUTPATIENT
Start: 2024-07-02

## 2024-09-28 DIAGNOSIS — M17.0 PRIMARY OSTEOARTHRITIS OF BOTH KNEES: ICD-10-CM

## 2024-09-28 RX ORDER — MELOXICAM 7.5 MG/1
7.5 TABLET ORAL DAILY PRN
Qty: 90 TABLET | Refills: 0 | Status: SHIPPED | OUTPATIENT
Start: 2024-09-28

## 2024-11-16 DIAGNOSIS — R45.4 IRRITABILITY: ICD-10-CM

## 2024-11-16 DIAGNOSIS — F32.A DEPRESSION, UNSPECIFIED DEPRESSION TYPE: ICD-10-CM

## 2024-11-18 RX ORDER — FLUOXETINE 10 MG/1
10 CAPSULE ORAL DAILY
Qty: 90 CAPSULE | Refills: 0 | Status: SHIPPED | OUTPATIENT
Start: 2024-11-18

## 2024-12-24 DIAGNOSIS — K21.9 GASTROESOPHAGEAL REFLUX DISEASE, UNSPECIFIED WHETHER ESOPHAGITIS PRESENT: ICD-10-CM

## 2024-12-25 DIAGNOSIS — M17.0 PRIMARY OSTEOARTHRITIS OF BOTH KNEES: ICD-10-CM

## 2024-12-25 RX ORDER — OMEPRAZOLE 40 MG/1
40 CAPSULE, DELAYED RELEASE ORAL DAILY
Qty: 90 CAPSULE | Refills: 2 | Status: SHIPPED | OUTPATIENT
Start: 2024-12-25 | End: 2025-07-13

## 2024-12-26 RX ORDER — MELOXICAM 7.5 MG/1
7.5 TABLET ORAL DAILY PRN
Qty: 90 TABLET | Refills: 0 | Status: SHIPPED | OUTPATIENT
Start: 2024-12-26

## 2025-01-23 DIAGNOSIS — I10 HYPERTENSION, ESSENTIAL, BENIGN: ICD-10-CM

## 2025-01-23 RX ORDER — HYDROCHLOROTHIAZIDE 25 MG/1
25 TABLET ORAL DAILY
Qty: 30 TABLET | Refills: 0 | Status: SHIPPED | OUTPATIENT
Start: 2025-01-23 | End: 2027-05-23

## 2025-02-14 DIAGNOSIS — F32.A DEPRESSION, UNSPECIFIED DEPRESSION TYPE: ICD-10-CM

## 2025-02-14 DIAGNOSIS — R45.4 IRRITABILITY: ICD-10-CM

## 2025-02-14 RX ORDER — FLUOXETINE 10 MG/1
10 CAPSULE ORAL DAILY
Qty: 90 CAPSULE | Refills: 0 | Status: SHIPPED | OUTPATIENT
Start: 2025-02-14

## 2025-02-16 DIAGNOSIS — I10 HYPERTENSION, ESSENTIAL, BENIGN: ICD-10-CM

## 2025-02-17 RX ORDER — HYDROCHLOROTHIAZIDE 25 MG/1
25 TABLET ORAL DAILY
Qty: 30 TABLET | Refills: 5 | Status: SHIPPED | OUTPATIENT
Start: 2025-02-17 | End: 2027-06-17

## 2025-02-23 ENCOUNTER — RA CDI HCC (OUTPATIENT)
Dept: OTHER | Facility: HOSPITAL | Age: 65
End: 2025-02-23

## 2025-02-28 ENCOUNTER — OFFICE VISIT (OUTPATIENT)
Dept: OBGYN CLINIC | Facility: CLINIC | Age: 65
End: 2025-02-28
Payer: COMMERCIAL

## 2025-02-28 VITALS — HEIGHT: 65 IN | WEIGHT: 192 LBS | BODY MASS INDEX: 31.99 KG/M2

## 2025-02-28 DIAGNOSIS — M17.0 PRIMARY OSTEOARTHRITIS OF BOTH KNEES: Primary | ICD-10-CM

## 2025-02-28 PROCEDURE — 99214 OFFICE O/P EST MOD 30 MIN: CPT | Performed by: ORTHOPAEDIC SURGERY

## 2025-02-28 PROCEDURE — 20610 DRAIN/INJ JOINT/BURSA W/O US: CPT | Performed by: ORTHOPAEDIC SURGERY

## 2025-02-28 RX ORDER — BUPIVACAINE HYDROCHLORIDE 2.5 MG/ML
1 INJECTION, SOLUTION INFILTRATION; PERINEURAL
Status: COMPLETED | OUTPATIENT
Start: 2025-02-28 | End: 2025-02-28

## 2025-02-28 RX ORDER — KETOROLAC TROMETHAMINE 30 MG/ML
30 INJECTION, SOLUTION INTRAMUSCULAR; INTRAVENOUS
Status: COMPLETED | OUTPATIENT
Start: 2025-02-28 | End: 2025-02-28

## 2025-02-28 RX ORDER — CELECOXIB 200 MG/1
200 CAPSULE ORAL 2 TIMES DAILY
Qty: 180 CAPSULE | Refills: 1 | Status: SHIPPED | OUTPATIENT
Start: 2025-02-28 | End: 2025-03-06 | Stop reason: SDUPTHER

## 2025-02-28 RX ORDER — METHYLPREDNISOLONE ACETATE 40 MG/ML
2 INJECTION, SUSPENSION INTRA-ARTICULAR; INTRALESIONAL; INTRAMUSCULAR; SOFT TISSUE
Status: COMPLETED | OUTPATIENT
Start: 2025-02-28 | End: 2025-02-28

## 2025-02-28 RX ADMIN — KETOROLAC TROMETHAMINE 30 MG: 30 INJECTION, SOLUTION INTRAMUSCULAR; INTRAVENOUS at 07:30

## 2025-02-28 RX ADMIN — BUPIVACAINE HYDROCHLORIDE 1 ML: 2.5 INJECTION, SOLUTION INFILTRATION; PERINEURAL at 07:30

## 2025-02-28 RX ADMIN — METHYLPREDNISOLONE ACETATE 2 ML: 40 INJECTION, SUSPENSION INTRA-ARTICULAR; INTRALESIONAL; INTRAMUSCULAR; SOFT TISSUE at 07:30

## 2025-02-28 NOTE — ASSESSMENT & PLAN NOTE
Xrays of the bilateral knees were reviewed from 5/10/2024  On exam, she has valgus alignment of knees, most pain over the lateral compartments.   Exercises were provided for the patient and her after visit summary in regards to her VMO quad.  A formal referral was placed in the patient's chart if she wishes to go to formal therapy.  Celebrex 200 mg 90-day supply was sent to the patient's confirmed pharmacy.  Due to acute exacerbation of bilateral knee joints osteoarthritis, the decision was made to proceed cortisone injection which she tolerated well.  Follow-up with the patient in 14 weeks for reevaluation  Orders:    Ambulatory Referral to Physical Therapy; Future    celecoxib (CeleBREX) 200 mg capsule; Take 1 capsule (200 mg total) by mouth 2 (two) times a day With food    Large joint arthrocentesis: R knee    Large joint arthrocentesis: L knee

## 2025-02-28 NOTE — PROGRESS NOTES
Assessment:  Assessment & Plan  Primary osteoarthritis of both knees  Xrays of the bilateral knees were reviewed from 5/10/2024  On exam, she has valgus alignment of knees, most pain over the lateral compartments.   Exercises were provided for the patient and her after visit summary in regards to her VMO quad.  A formal referral was placed in the patient's chart if she wishes to go to formal therapy.  Celebrex 200 mg 90-day supply was sent to the patient's confirmed pharmacy.  Due to acute exacerbation of bilateral knee joints osteoarthritis, the decision was made to proceed cortisone injection which she tolerated well.  Follow-up with the patient in 14 weeks for reevaluation  Orders:    Ambulatory Referral to Physical Therapy; Future    celecoxib (CeleBREX) 200 mg capsule; Take 1 capsule (200 mg total) by mouth 2 (two) times a day With food    Large joint arthrocentesis: R knee    Large joint arthrocentesis: L knee        To do next visit:  Return in about 14 weeks (around 6/6/2025) for bilateral knees.    The above stated was discussed in layman's terms and the patient expressed understanding.  All questions were answered to the patient's satisfaction.       Scribe Attestation      I,:  Siria Glaser am acting as a scribe while in the presence of the attending physician.:       I,:  Neema Glasgow MD personally performed the services described in this documentation    as scribed in my presence.:               Subjective:   Radha Black is a 64 y.o. female who presents today for follow-up for her bilateral knee pain due to osteoarthritis.  Most the patient's arthritis is located in the patellofemoral region.  At her last visit patient was provided exercises and meloxicam 7.5mg.   She does not have any swelling she has a hx of a left knee arthroscopy from 2006, then tried cortisone injection, Visco supplement.     Review of systems negative unless otherwise specified in HPI  Review of Systems    Constitutional:  Negative for chills, fever and unexpected weight change.   HENT:  Negative for hearing loss, nosebleeds and sore throat.    Eyes:  Negative for pain, redness and visual disturbance.   Respiratory:  Negative for cough, shortness of breath and wheezing.    Cardiovascular:  Negative for chest pain, palpitations and leg swelling.   Gastrointestinal:  Negative for abdominal pain and nausea.   Genitourinary:  Negative for dyspareunia, dysuria and frequency.   Musculoskeletal:  Positive for arthralgias.   Skin:  Negative for rash and wound.   Neurological:  Negative for dizziness, numbness and headaches.   Psychiatric/Behavioral:  Negative for decreased concentration and suicidal ideas. The patient is not nervous/anxious.        Past Medical History:   Diagnosis Date    Arthritis 1985    Cancer (HCC) 2018    skin cancer basal cell    GERD (gastroesophageal reflux disease) 1985    unsure of date    HL (hearing loss) lifetime    hearing aids in 2021    Hypertension     5 years    Shingles 1980’s    a few times over the past many years. the last time was in 2018       Past Surgical History:   Procedure Laterality Date    COLONOSCOPY      KNEE SURGERY  2006    arthroscopy left knee 2006    WISDOM TOOTH EXTRACTION         Family History   Problem Relation Age of Onset    No Known Problems Mother     Hypertension Father     Diabetes Father     Hearing loss Father     Breast cancer Maternal Grandmother     Colon cancer Maternal Grandfather     Breast cancer Maternal Aunt     Breast cancer Maternal Aunt        Social History     Occupational History    Not on file   Tobacco Use    Smoking status: Never    Smokeless tobacco: Never   Vaping Use    Vaping status: Never Used   Substance and Sexual Activity    Alcohol use: Not Currently     Alcohol/week: 1.0 standard drink of alcohol     Types: 1 Standard drinks or equivalent per week     Comment: rarely    Drug use: Never    Sexual activity: Yes     Partners: Male      Birth control/protection: Post-menopausal         Current Outpatient Medications:     FLUoxetine (PROzac) 10 mg capsule, TAKE 1 CAPSULE BY MOUTH EVERY DAY, Disp: 90 capsule, Rfl: 0    hydroCHLOROthiazide 25 mg tablet, TAKE 1 TABLET (25 MG TOTAL) BY MOUTH DAILY., Disp: 30 tablet, Rfl: 5    multivitamin (THERAGRAN) TABS, Take 1 tablet by mouth daily, Disp: , Rfl:     omeprazole (PriLOSEC) 40 MG capsule, TAKE 1 CAPSULE (40 MG TOTAL) BY MOUTH DAILY., Disp: 90 capsule, Rfl: 2    meloxicam (MOBIC) 7.5 mg tablet, TAKE 1 TABLET (7.5 MG TOTAL) BY MOUTH DAILY AS NEEDED FOR MODERATE PAIN (Patient not taking: Reported on 2/28/2025), Disp: 90 tablet, Rfl: 0    No Known Allergies       There were no vitals filed for this visit.    Body mass index is 31.95 kg/m².  Wt Readings from Last 3 Encounters:   02/28/25 87.1 kg (192 lb)   05/10/24 85.7 kg (189 lb)   04/08/24 83.9 kg (185 lb)       Objective:    Right knee:   no abrasion  Mild effusion  Lateral joint line tenderness  No medial joint line tenderness  Valgus alignment of the knee  Positive patella grind test  Distally neurovascularly intact     Left knee:  No abrasions open wounds  No effusion noted  Mild medial joint line tenderness  Minimal lateral joint line tenderness  Tender coronal/sagittal plane strength  Positive crepitus with patellar grind test  Neurologically vastly intact                      Diagnostics, reviewed and taken today if performed as documented:    None performed      The attending physician has personally reviewed the pertinent films in PACS and interpretation is as follows:  Right knee with tricompartmental arthritis; lateral tibiofemoral and patellofemoral involvement  Left knee with patellofemoral arthritis and mild tibiofemoral arthritic changes    Procedures, if performed today:    Large joint arthrocentesis: R knee  Universal Protocol:  Consent: Verbal consent obtained.  Risks and benefits: risks, benefits and alternatives were  "discussed  Consent given by: patient  Time out: Immediately prior to procedure a \"time out\" was called to verify the correct patient, procedure, equipment, support staff and site/side marked as required.  Timeout called at: 2/28/2025 8:28 AM.  Patient understanding: patient states understanding of the procedure being performed  Site marked: the operative site was marked  Patient identity confirmed: verbally with patient  Supporting Documentation  Indications: pain   Procedure Details  Location: knee - R knee  Preparation: Patient was prepped and draped in the usual sterile fashion  Needle size: 22 G  Ultrasound guidance: no  Approach: anterolateral  Medications administered: 2 mL methylPREDNISolone acetate 40 mg/mL; 1 mL bupivacaine 0.25 %; 30 mg ketorolac 30 mg/mL    Patient tolerance: patient tolerated the procedure well with no immediate complications  Dressing:  Sterile dressing applied      Large joint arthrocentesis: L knee  Universal Protocol:  Consent: Verbal consent obtained.  Risks and benefits: risks, benefits and alternatives were discussed  Consent given by: patient  Time out: Immediately prior to procedure a \"time out\" was called to verify the correct patient, procedure, equipment, support staff and site/side marked as required.  Timeout called at: 2/28/2025 8:28 AM.  Patient understanding: patient states understanding of the procedure being performed  Site marked: the operative site was marked  Patient identity confirmed: verbally with patient  Supporting Documentation  Indications: pain   Procedure Details  Location: knee - L knee  Preparation: Patient was prepped and draped in the usual sterile fashion  Needle size: 22 G  Ultrasound guidance: no  Approach: anterolateral  Medications administered: 2 mL methylPREDNISolone acetate 40 mg/mL; 1 mL bupivacaine 0.25 %; 30 mg ketorolac 30 mg/mL    Patient tolerance: patient tolerated the procedure well with no immediate complications  Dressing:  Sterile " "dressing applied          Portions of the record may have been created with voice recognition software.  Occasional wrong word or \"sound a like\" substitutions may have occurred due to the inherent limitations of voice recognition software.  Read the chart carefully and recognize, using context, where substitutions have occurred.  "

## 2025-03-03 ENCOUNTER — TELEPHONE (OUTPATIENT)
Dept: ADMINISTRATIVE | Facility: OTHER | Age: 65
End: 2025-03-03

## 2025-03-03 ENCOUNTER — OFFICE VISIT (OUTPATIENT)
Dept: INTERNAL MEDICINE CLINIC | Facility: CLINIC | Age: 65
End: 2025-03-03
Payer: COMMERCIAL

## 2025-03-03 VITALS
OXYGEN SATURATION: 97 % | DIASTOLIC BLOOD PRESSURE: 120 MMHG | WEIGHT: 192 LBS | HEART RATE: 81 BPM | SYSTOLIC BLOOD PRESSURE: 140 MMHG | BODY MASS INDEX: 31.95 KG/M2

## 2025-03-03 DIAGNOSIS — R73.03 PREDIABETES: ICD-10-CM

## 2025-03-03 DIAGNOSIS — Z00.00 WELCOME TO MEDICARE PREVENTIVE VISIT: Primary | ICD-10-CM

## 2025-03-03 DIAGNOSIS — I10 HYPERTENSION, ESSENTIAL, BENIGN: ICD-10-CM

## 2025-03-03 DIAGNOSIS — K21.9 GASTROESOPHAGEAL REFLUX DISEASE, UNSPECIFIED WHETHER ESOPHAGITIS PRESENT: ICD-10-CM

## 2025-03-03 DIAGNOSIS — H61.23 BILATERAL IMPACTED CERUMEN: ICD-10-CM

## 2025-03-03 DIAGNOSIS — Z23 ENCOUNTER FOR IMMUNIZATION: ICD-10-CM

## 2025-03-03 DIAGNOSIS — M81.0 OSTEOPOROSIS, UNSPECIFIED OSTEOPOROSIS TYPE, UNSPECIFIED PATHOLOGICAL FRACTURE PRESENCE: ICD-10-CM

## 2025-03-03 DIAGNOSIS — F32.4 MAJOR DEPRESSIVE DISORDER WITH SINGLE EPISODE, IN PARTIAL REMISSION (HCC): ICD-10-CM

## 2025-03-03 PROCEDURE — G0402 INITIAL PREVENTIVE EXAM: HCPCS | Performed by: GENERAL ACUTE CARE HOSPITAL

## 2025-03-03 RX ORDER — OMEPRAZOLE 20 MG/1
20 CAPSULE, DELAYED RELEASE ORAL DAILY
Qty: 90 CAPSULE | Refills: 1 | Status: SHIPPED | OUTPATIENT
Start: 2025-03-03

## 2025-03-03 NOTE — LETTER
Vaccination Request Form: COVID-19 aka SARS-CoV-2 (Moderna or Pfizer or J & J) and Influenza      Date Requested: 25  Patient: Radha Black  Patient : 1960   Referring Provider: Maris Nam MD       The above patient has informed us that they have had their   most recent COVID-19 aka SARS-CoV-2 (Moderna or Pfizer or J & J) and Influenza administered at your facility. Please   complete this form and attach all corresponding documentation.    Date of Vaccine(s) Given  ______________________________    Lot Number(s) _______________________________________    Manufacture(s) ______________________________________    Dose Amount (s) _____________________________________    Expiration Date(s) ____________________________________    Comments __________________________________________________________  ____________________________________________________________________  ____________________________________________________________________  ____________________________________________________________________    Administering Facility  ________________________________________________    Vaccine Administered By (print name) ___________________________________      Form Completed By (print name) _______________________________________      Signature ___________________________________________________________      These reports are needed for  compliance.    Please fax this completed form and a copy of the Vaccine Document(s) to our office located at [address] [place] [address] as soon as possible to Fax [phone] sonia Lopez: Phone [phone]    We thank you for your assistance in treating our mutual patient.

## 2025-03-03 NOTE — PROGRESS NOTES
Name: Radha Black      : 1960      MRN: [id number]  Encounter Provider: Maris Nam MD  Encounter Date: 3/3/2025   Encounter department: [facility]    Assessment & Plan  Welcome to Medicare preventive visit  Vaccinations:flu shot/covid vaccine done winter 2024, due pcv 20, not available in office today, will give in next visit  Advance care planning:does not have living will, plan to have  as POA  Cognition:no concern  Mammogram:UTD  Colon cancer screen:colonoscopy 2023, repeat in 7y  DEXA: ordered  Healthy diet and regular exercise         Hypertension, essential, benign  Elevated in office.   Did not take HCTZ yet today, reports compliant with it.   Does not check home BP  Advice to check home BP in the morning and evening. Bring readings to next visit.     Orders:    Comprehensive metabolic panel; Future    Major depressive disorder with single episode, in partial remission (HCC)  Feels a little worse compared to last year. Mainly triggered by the current world events. Exercise helps. Not interested in therapy. Continue prozac for now. She will let me know if it's worse consider increasing dose.     Depression Screening Follow-up Plan: Patient's depression screening was positive with a PHQ-9 score of 7. Patient assessed for underlying major depression. They have no active suicidal ideations. Brief counseling provided and recommend additional follow-up/re-evaluation next office visit.         Gastroesophageal reflux disease, unspecified whether esophagitis present  On 40mg daily for over a year.   EGD reviewed unremarkable.   Try a lower dose, 20mg daily  Orders:    omeprazole (PriLOSEC) 20 mg delayed release capsule; Take 1 capsule (20 mg total) by mouth daily    Encounter for immunization    Orders:    Pneumococcal Conjugate Vaccine 20-valent (Pcv20)    Osteoporosis, unspecified osteoporosis type, unspecified pathological fracture presence    Orders:    DXA bone density  spine hip and pelvis; Future    Prediabetes  Counseling provided on diet and exercise  Orders:    Hemoglobin A1C; Future    Lipid Panel With Direct LDL; Future    Bilateral impacted cerumen  Try debrox ear drop.   Will recheck in next visit            Preventive health issues were discussed with patient, and age appropriate screening tests were ordered as noted in patient's After Visit Summary. Personalized health advice and appropriate referrals for health education or preventive services given if needed, as noted in patient's After Visit Summary.    History of Present Illness     HPI   Patient Care Team:  Maris Nam MD as PCP - General (Internal Medicine)    Review of Systems  Medical History Reviewed by provider this encounter:       Annual Wellness Visit Questionnaire   Radha is here for her Welcome to Medicare visit.     Health Risk Assessment:   Patient rates overall health as good. Patient feels that their physical health rating is same. Patient is satisfied with their life. Eyesight was rated as same. Hearing was rated as same. Patient feels that their emotional and mental health rating is slightly worse. Patients states they are sometimes angry. Patient states they are often unusually tired/fatigued. Pain experienced in the last 7 days has been some. Patient's pain rating has been 3/10. Patient states that she has experienced no weight loss or gain in last 6 months.     Depression Screening:   PHQ-9 Score: 7      Fall Risk Screening:   In the past year, patient has experienced: no history of falling in past year      Urinary Incontinence Screening:   Patient has not leaked urine accidently in the last six months.     Home Safety:  Patient has trouble with stairs inside or outside of their home. Patient has working smoke alarms and has no working carbon monoxide detector. Home safety hazards include: none.     Nutrition:   Current diet is Regular.     Medications:   Patient is not currently taking any  over-the-counter supplements. Patient is able to manage medications.     Activities of Daily Living (ADLs)/Instrumental Activities of Daily Living (IADLs):   Walk and transfer into and out of bed and chair?: Yes  Dress and groom yourself?: Yes    Bathe or shower yourself?: Yes    Feed yourself? Yes  Do your laundry/housekeeping?: Yes  Manage your money, pay your bills and track your expenses?: Yes  Make your own meals?: Yes    Do your own shopping?: Yes    Previous Hospitalizations:   Any hospitalizations or ED visits within the last 12 months?: No      Advance Care Planning:   Living will: No    Durable POA for healthcare: No    Advanced directive: No      PREVENTIVE SCREENINGS      Cardiovascular Screening:    General: Screening Current      Diabetes Screening:     General: Screening Current      Colorectal Cancer Screening:     General: Screening Current      Breast Cancer Screening:     General: Screening Current      Cervical Cancer Screening:    General: Screening Not Indicated      Osteoporosis Screening:    General: Screening Not Indicated and History Osteoporosis      Lung Cancer Screening:     General: Screening Not Indicated      Hepatitis C Screening:    General: Screening Current    Screening, Brief Intervention, and Referral to Treatment (SBIRT)     Screening  Typical number of drinks in a day: 0  Typical number of drinks in a week: 0  Interpretation: Low risk drinking behavior.    AUDIT-C Screenin) How often did you have a drink containing alcohol in the past year? monthly or less  2) How many drinks did you have on a typical day when you were drinking in the past year? 1 to 2  3) How often did you have 6 or more drinks on one occasion in the past year? never    AUDIT-C Score: 1  Interpretation: Score 0-2 (female): Negative screen for alcohol misuse    Single Item Drug Screening:  How often have you used an illegal drug (including marijuana) or a prescription medication for non-medical reasons in  the past year? never    Single Item Drug Screen Score: 0  Interpretation: Negative screen for possible drug use disorder    Social Drivers of Health     Food Insecurity: No Food Insecurity (2/27/2025)    Hunger Vital Sign     Worried About Running Out of Food in the Last Year: Never true     Ran Out of Food in the Last Year: Never true   Transportation Needs: No Transportation Needs (2/27/2025)    PRAPARE - Transportation     Lack of Transportation (Medical): No     Lack of Transportation (Non-Medical): No   Housing Stability: Low Risk  (2/27/2025)    Housing Stability Vital Sign     Unable to Pay for Housing in the Last Year: No     Number of Times Moved in the Last Year: 0     Homeless in the Last Year: No   Utilities: Not At Risk (2/27/2025)    [facility] Utilities     Threatened with loss of utilities: No     No results found.    Objective   BP (!) 140/120   Pulse 81   Wt 87.1 kg (192 lb)   SpO2 97%   BMI 31.95 kg/m²     Physical Exam

## 2025-03-03 NOTE — ASSESSMENT & PLAN NOTE
Feels a little worse compared to last year. Mainly triggered by the current world events. Exercise helps. Not interested in therapy. Continue prozac for now. She will let me know if it's worse consider increasing dose.     Depression Screening Follow-up Plan: Patient's depression screening was positive with a PHQ-9 score of 7. Patient assessed for underlying major depression. They have no active suicidal ideations. Brief counseling provided and recommend additional follow-up/re-evaluation next office visit.

## 2025-03-03 NOTE — LETTER
Vaccination Request Form: COVID-19 aka SARS-CoV-2 (Moderna or Pfizer or J & J) and Influenza      Date Requested: 25  Patient: Radha Black  Patient : 1960   Referring Provider: Maris Nam MD       The above patient has informed us that they have had their   most recent COVID-19 aka SARS-CoV-2 (Moderna or Pfizer or J & J) and Influenza administered at your facility. Please   complete this form and attach all corresponding documentation.    Date of Vaccine(s) Given  ______________________________    Lot Number(s) _______________________________________    Manufacture(s) ______________________________________    Dose Amount (s) _____________________________________    Expiration Date(s) ____________________________________    Comments __________________________________________________________  ____________________________________________________________________  ____________________________________________________________________  ____________________________________________________________________    Administering Facility  ________________________________________________    Vaccine Administered By (print name) ___________________________________      Form Completed By (print name) _______________________________________      Signature ___________________________________________________________      These reports are needed for  compliance.    Please fax this completed form and a copy of the Vaccine Document(s) to our office located at [address] [place] [address] as soon as possible to Fax [phone] sonia Lopez: Phone [phone]    We thank you for your assistance in treating our mutual patient.

## 2025-03-03 NOTE — TELEPHONE ENCOUNTER
----- Message from John MCCONNELL sent at 3/3/2025  9:04 AM EST -----  03/03/25 9:04 AM    Hello, our patient CARLITOS MARRERO [[id number] )  has had Immunization(s) completed/performed. Please assist in updating the patient chart by making an External outreach to [facility]  facility located in [place]  The date of service is 10/21/24  Flu and Covid .     Thank you,  John Rodriguez MA  [facility]

## 2025-03-03 NOTE — PATIENT INSTRUCTIONS
Check your BP at home, morning and evening, write them down and bring the readings in next visit.   Get labs done prior to next visit    Medicare Preventive Visit Patient Instructions  Thank you for completing your Welcome to Medicare Visit or Medicare Annual Wellness Visit today. Your next wellness visit will be due in one year (3/4/2026).  The screening/preventive services that you may require over the next 5-10 years are detailed below. Some tests may not apply to you based off risk factors and/or age. Screening tests ordered at today's visit but not completed yet may show as past due. Also, please note that scanned in results may not display below.  Preventive Screenings:  Service Recommendations Previous Testing/Comments   Colorectal Cancer Screening  * Colonoscopy    * Fecal Occult Blood Test (FOBT)/Fecal Immunochemical Test (FIT)  * Fecal DNA/Cologuard Test  * Flexible Sigmoidoscopy Age: 45-75 years old   Colonoscopy: every 10 years (may be performed more frequently if at higher risk)  OR  FOBT/FIT: every 1 year  OR  Cologuard: every 3 years  OR  Sigmoidoscopy: every 5 years  Screening may be recommended earlier than age 45 if at higher risk for colorectal cancer. Also, an individualized decision between you and your healthcare provider will decide whether screening between the ages of 76-85 would be appropriate. Colonoscopy: 10/16/2023  FOBT/FIT: Not on file  Cologuard: Not on file  Sigmoidoscopy: Not on file    Screening Current     Breast Cancer Screening Age: 40+ years old  Frequency: every 1-2 years  Not required if history of left and right mastectomy Mammogram: 08/09/2024    Screening Current   Cervical Cancer Screening Between the ages of 21-29, pap smear recommended once every 3 years.   Between the ages of 30-65, can perform pap smear with HPV co-testing every 5 years.   Recommendations may differ for women with a history of total hysterectomy, cervical cancer, or abnormal pap smears in past. Pap  Smear: 04/19/2023    Screening Not Indicated   Hepatitis C Screening Once for adults born between 1945 and 1965  More frequently in patients at high risk for Hepatitis C Hep C Antibody: 04/05/2018    Screening Current   Diabetes Screening 1-2 times per year if you're at risk for diabetes or have pre-diabetes Fasting glucose: No results in last 5 years (No results in last 5 years)  A1C: 6.0 % of total Hgb (3/29/2024)  Screening Current   Cholesterol Screening Once every 5 years if you don't have a lipid disorder. May order more often based on risk factors. Lipid panel: 03/29/2024    Screening Current     Other Preventive Screenings Covered by Medicare:  Abdominal Aortic Aneurysm (AAA) Screening: covered once if your at risk. You're considered to be at risk if you have a family history of AAA.  Lung Cancer Screening: covers low dose CT scan once per year if you meet all of the following conditions: (1) Age 55-77; (2) No signs or symptoms of lung cancer; (3) Current smoker or have quit smoking within the last 15 years; (4) You have a tobacco smoking history of at least 20 pack years (packs per day multiplied by number of years you smoked); (5) You get a written order from a healthcare provider.  Glaucoma Screening: covered annually if you're considered high risk: (1) You have diabetes OR (2) Family history of glaucoma OR (3)  aged 50 and older OR (4)  American aged 65 and older  Osteoporosis Screening: covered every 2 years if you meet one of the following conditions: (1) You're estrogen deficient and at risk for osteoporosis based off medical history and other findings; (2) Have a vertebral abnormality; (3) On glucocorticoid therapy for more than 3 months; (4) Have primary hyperparathyroidism; (5) On osteoporosis medications and need to assess response to drug therapy.   Last bone density test (DXA Scan): Not on file.  HIV Screening: covered annually if you're between the age of 15-65. Also  covered annually if you are younger than 15 and older than 65 with risk factors for HIV infection. For pregnant patients, it is covered up to 3 times per pregnancy.    Immunizations:  Immunization Recommendations   Influenza Vaccine Annual influenza vaccination during flu season is recommended for all persons aged >= 6 months who do not have contraindications   Pneumococcal Vaccine   * Pneumococcal conjugate vaccine = PCV13 (Prevnar 13), PCV15 (Vaxneuvance), PCV20 (Prevnar 20)  * Pneumococcal polysaccharide vaccine = PPSV23 (Pneumovax) Adults 19-65 yo with certain risk factors or if 65+ yo  If never received any pneumonia vaccine: recommend Prevnar 20 (PCV20)  Give PCV20 if previously received 1 dose of PCV13 or PPSV23   Hepatitis B Vaccine 3 dose series if at intermediate or high risk (ex: diabetes, end stage renal disease, liver disease)   Respiratory syncytial virus (RSV) Vaccine - COVERED BY MEDICARE PART D  * RSVPreF3 (Arexvy) CDC recommends that adults 60 years of age and older may receive a single dose of RSV vaccine using shared clinical decision-making (SCDM)   Tetanus (Td) Vaccine - COST NOT COVERED BY MEDICARE PART B Following completion of primary series, a booster dose should be given every 10 years to maintain immunity against tetanus. Td may also be given as tetanus wound prophylaxis.   Tdap Vaccine - COST NOT COVERED BY MEDICARE PART B Recommended at least once for all adults. For pregnant patients, recommended with each pregnancy.   Shingles Vaccine (Shingrix) - COST NOT COVERED BY MEDICARE PART B  2 shot series recommended in those 19 years and older who have or will have weakened immune systems or those 50 years and older     Health Maintenance Due:      Topic Date Due    HIV Screening  Never done    Breast Cancer Screening: Mammogram  08/09/2025    Colorectal Cancer Screening  10/14/2030    Hepatitis C Screening  Completed     Immunizations Due:      Topic Date Due    Pneumococcal Vaccine: 65+  Years (1 of 1 - PCV) Never done     Advance Directives   What are advance directives?  Advance directives are legal documents that state your wishes and plans for medical care. These plans are made ahead of time in case you lose your ability to make decisions for yourself. Advance directives can apply to any medical decision, such as the treatments you want, and if you want to donate organs.   What are the types of advance directives?  There are many types of advance directives, and each state has rules about how to use them. You may choose a combination of any of the following:  Living will:  This is a written record of the treatment you want. You can also choose which treatments you do not want, which to limit, and which to stop at a certain time. This includes surgery, medicine, IV fluid, and tube feedings.   Durable power of  for healthcare (DPAHC):  This is a written record that states who you want to make healthcare choices for you when you are unable to make them for yourself. This person, called a proxy, is usually a family member or a friend. You may choose more than 1 proxy.  Do not resuscitate (DNR) order:  A DNR order is used in case your heart stops beating or you stop breathing. It is a request not to have certain forms of treatment, such as CPR. A DNR order may be included in other types of advance directives.  Medical directive:  This covers the care that you want if you are in a coma, near death, or unable to make decisions for yourself. You can list the treatments you want for each condition. Treatment may include pain medicine, surgery, blood transfusions, dialysis, IV or tube feedings, and a ventilator (breathing machine).  Values history:  This document has questions about your views, beliefs, and how you feel and think about life. This information can help others choose the care that you would choose.  Why are advance directives important?  An advance directive helps you control your  care. Although spoken wishes may be used, it is better to have your wishes written down. Spoken wishes can be misunderstood, or not followed. Treatments may be given even if you do not want them. An advance directive may make it easier for your family to make difficult choices about your care.   Weight Management   Why it is important to manage your weight:  Being overweight increases your risk of health conditions such as heart disease, high blood pressure, type 2 diabetes, and certain types of cancer. It can also increase your risk for osteoarthritis, sleep apnea, and other respiratory problems. Aim for a slow, steady weight loss. Even a small amount of weight loss can lower your risk of health problems.  How to lose weight safely:  A safe and healthy way to lose weight is to eat fewer calories and get regular exercise. You can lose up about 1 pound a week by decreasing the number of calories you eat by 500 calories each day.   Healthy meal plan for weight management:  A healthy meal plan includes a variety of foods, contains fewer calories, and helps you stay healthy. A healthy meal plan includes the following:  Eat whole-grain foods more often.  A healthy meal plan should contain fiber. Fiber is the part of grains, fruits, and vegetables that is not broken down by your body. Whole-grain foods are healthy and provide extra fiber in your diet. Some examples of whole-grain foods are whole-wheat breads and pastas, oatmeal, brown rice, and bulgur.  Eat a variety of vegetables every day.  Include dark, leafy greens such as spinach, kale, celia greens, and mustard greens. Eat yellow and orange vegetables such as carrots, sweet potatoes, and winter squash.   Eat a variety of fruits every day.  Choose fresh or canned fruit (canned in its own juice or light syrup) instead of juice. Fruit juice has very little or no fiber.  Eat low-fat dairy foods.  Drink fat-free (skim) milk or 1% milk. Eat fat-free yogurt and low-fat  cottage cheese. Try low-fat cheeses such as mozzarella and other reduced-fat cheeses.  Choose meat and other protein foods that are low in fat.  Choose beans or other legumes such as split peas or lentils. Choose fish, skinless poultry (chicken or turkey), or lean cuts of red meat (beef or pork). Before you cook meat or poultry, cut off any visible fat.   Use less fat and oil.  Try baking foods instead of frying them. Add less fat, such as margarine, sour cream, regular salad dressing and mayonnaise to foods. Eat fewer high-fat foods. Some examples of high-fat foods include french fries, doughnuts, ice cream, and cakes.  Eat fewer sweets.  Limit foods and drinks that are high in sugar. This includes candy, cookies, regular soda, and sweetened drinks.  Exercise:  Exercise at least 30 minutes per day on most days of the week. Some examples of exercise include walking, biking, dancing, and swimming. You can also fit in more physical activity by taking the stairs instead of the elevator or parking farther away from stores. Ask your healthcare provider about the best exercise plan for you.      © Copyright [employer] 2018 Information is for End User's use only and may not be sold, redistributed or otherwise used for commercial purposes. All illustrations and images included in CareNotes® are the copyrighted property of A.D.A.M., Inc. or [employer]

## 2025-03-03 NOTE — LETTER
Vaccination Request Form: COVID-19 aka SARS-CoV-2 (Moderna or Pfizer or J & J) and Influenza      Date Requested: 25  Patient: Radha Black  Patient : 1960   Referring Provider: Maris Nam MD       The above patient has informed us that they have had their   most recent COVID-19 aka SARS-CoV-2 (Moderna or Pfizer or J & J) and Influenza administered at your facility. Please   complete this form and attach all corresponding documentation.    Date of Vaccine(s) Given  ______________________________    Lot Number(s) _______________________________________    Manufacture(s) ______________________________________    Dose Amount (s) _____________________________________    Expiration Date(s) ____________________________________    Comments __________________________________________________________  ____________________________________________________________________  ____________________________________________________________________  ____________________________________________________________________    Administering Facility  ________________________________________________    Vaccine Administered By (print name) ___________________________________      Form Completed By (print name) _______________________________________      Signature ___________________________________________________________      These reports are needed for  compliance.    Please fax this completed form and a copy of the Vaccine Document(s) to our office located at [address] [place] [address] as soon as possible to Fax [phone] sonia Lopez: Phone [phone]    We thank you for your assistance in treating our mutual patient.

## 2025-03-03 NOTE — ASSESSMENT & PLAN NOTE
On 40mg daily for over a year.   EGD reviewed unremarkable.   Try a lower dose, 20mg daily  Orders:    omeprazole (PriLOSEC) 20 mg delayed release capsule; Take 1 capsule (20 mg total) by mouth daily

## 2025-03-03 NOTE — ASSESSMENT & PLAN NOTE
Elevated in office.   Did not take HCTZ yet today, reports compliant with it.   Does not check home BP  Advice to check home BP in the morning and evening. Bring readings to next visit.     Orders:    Comprehensive metabolic panel; Future

## 2025-03-03 NOTE — ASSESSMENT & PLAN NOTE
Vaccinations:flu shot/covid vaccine done winter 2024, due pcv 20, not available in office today, will give in next visit  Advance care planning:does not have living will, plan to have  as POA  Cognition:no concern  Mammogram:UTD  Colon cancer screen:colonoscopy 7/2023, repeat in 7y  DEXA: ordered  Healthy diet and regular exercise

## 2025-03-04 NOTE — TELEPHONE ENCOUNTER
Upon review of the In Basket request and the patient's chart, initial outreach has been made via fax to facility. Please see Contacts section for details.     Thank you  John Segundo MA

## 2025-03-06 DIAGNOSIS — M17.0 PRIMARY OSTEOARTHRITIS OF BOTH KNEES: ICD-10-CM

## 2025-03-06 RX ORDER — CELECOXIB 200 MG/1
200 CAPSULE ORAL 2 TIMES DAILY
Qty: 180 CAPSULE | Refills: 0 | Status: SHIPPED | OUTPATIENT
Start: 2025-03-06

## 2025-03-12 NOTE — TELEPHONE ENCOUNTER
As a follow-up, a second attempt has been made for outreach via fax to facility. Please see Contacts section for details.    Thank you  John Segundo MA

## 2025-03-20 NOTE — TELEPHONE ENCOUNTER
As a final attempt, a third outreach has been made via fax to facility. Please see Contacts section for details. This encounter will be closed and completed by end of day. Should we receive the requested information because of previous outreach attempts, the requested patient's chart will be updated appropriately.     Thank you  John Segundo MA

## 2025-03-23 DIAGNOSIS — M17.0 PRIMARY OSTEOARTHRITIS OF BOTH KNEES: ICD-10-CM

## 2025-03-24 RX ORDER — MELOXICAM 7.5 MG/1
7.5 TABLET ORAL DAILY PRN
Qty: 90 TABLET | Refills: 0 | Status: SHIPPED | OUTPATIENT
Start: 2025-03-24

## 2025-04-02 PROBLEM — Z00.00 WELCOME TO MEDICARE PREVENTIVE VISIT: Status: RESOLVED | Noted: 2025-03-03 | Resolved: 2025-04-02

## 2025-04-02 PROBLEM — H61.23 BILATERAL IMPACTED CERUMEN: Status: RESOLVED | Noted: 2025-03-03 | Resolved: 2025-04-02

## 2025-05-12 DIAGNOSIS — F32.A DEPRESSION, UNSPECIFIED DEPRESSION TYPE: ICD-10-CM

## 2025-05-12 DIAGNOSIS — R45.4 IRRITABILITY: ICD-10-CM

## 2025-05-12 RX ORDER — FLUOXETINE 10 MG/1
10 CAPSULE ORAL DAILY
Qty: 90 CAPSULE | Refills: 0 | Status: SHIPPED | OUTPATIENT
Start: 2025-05-12

## 2025-05-21 DIAGNOSIS — M17.0 PRIMARY OSTEOARTHRITIS OF BOTH KNEES: ICD-10-CM

## 2025-05-22 RX ORDER — CELECOXIB 200 MG/1
CAPSULE ORAL
Qty: 180 CAPSULE | Refills: 0 | Status: SHIPPED | OUTPATIENT
Start: 2025-05-22

## 2025-07-11 ENCOUNTER — OFFICE VISIT (OUTPATIENT)
Dept: OBGYN CLINIC | Facility: CLINIC | Age: 65
End: 2025-07-11
Payer: COMMERCIAL

## 2025-07-11 VITALS — WEIGHT: 196 LBS | HEIGHT: 65 IN | BODY MASS INDEX: 32.65 KG/M2

## 2025-07-11 DIAGNOSIS — M17.12 PRIMARY OSTEOARTHRITIS OF LEFT KNEE: Primary | ICD-10-CM

## 2025-07-11 DIAGNOSIS — M17.11 PRIMARY OSTEOARTHRITIS OF RIGHT KNEE: ICD-10-CM

## 2025-07-11 PROCEDURE — 99213 OFFICE O/P EST LOW 20 MIN: CPT | Performed by: ORTHOPAEDIC SURGERY

## 2025-07-11 NOTE — PROGRESS NOTES
"Name: Radha Black      : 1960      MRN: [id number]  Encounter Provider: Neema Glasgow MD  Encounter Date: 2025   Encounter department: [facility]  :  Assessment & Plan  Primary osteoarthritis of left knee  Weightbearing as tolerated left lower extremity  Patient wishes to hold off on her injection until 2025 prior to her next vacation she will also have a follow-up visit on 2025 for repeat injections prior to another vacation  Advised patient continue Celebrex as prescribed by our office  Advised Voltaren gel may help to assist with her pain  Continue home range of motion stretching strengthening exercise specifically VMO strengthening exercises       Primary osteoarthritis of right knee  Weightbearing as tolerated right lower extremity  Patient wishes to hold off on her injection until 2025 prior to her next vacation she will also have a follow-up visit on 2025 for repeat injections prior to another vacation  Advised patient continue Celebrex as prescribed by our office  Advised Voltaren gel may help to assist with her pain  Continue home range of motion stretching strengthening exercise specifically VMO strengthening exercises           History of Present Illness   HPI  Radha Black is a 65 y.o. female who presents to the office today regarding bilateral knee pain.  She states the pain is aching nature worse.  Mildly relieved with rest.  She states having pain after standing for long periods time going to standing.  Patient has had significant relief with intra-articular injections.  States the pain is aching in nature.  Patient has been working on home range of motion stretching strength exercise.  She is taking Celebrex 200 once daily as prescribed at her last office visit.  Denies any radiation pain numbness or tingling.     Objective   Ht 5' 5\" (1.651 m)   Wt 88.9 kg (196 lb)   BMI 32.62 kg/m²    "                 Review Of Systems:   Skin: Normal  Neuro: See HPI  Musculoskeletal: See HPI  All other systems reviewed and are negative    Past Medical History:   Past Medical History[1]    Past Surgical History:   Past Surgical History[2]    Family History:  Family history reviewed and non-contributory  Family History[3]      Social History:  Social History[4]    Allergies:   Allergies[5]    Labs:  0   Lab Value Date/Time    HCT 42.1 03/29/2024 0832    HGB 14.3 03/29/2024 0832    WBC 6.4 03/29/2024 0832       Meds:  Current Medications[6]    Body mass index is 32.62 kg/m².  Wt Readings from Last 3 Encounters:   07/11/25 88.9 kg (196 lb)   03/03/25 87.1 kg (192 lb)   02/28/25 87.1 kg (192 lb)     Physical Exam:   There were no vitals filed for this visit.    General Appearance:    Alert, cooperative, no distress, appears stated age   Head:    Normocephalic, without obvious abnormality, atraumatic   Eyes:    conjunctiva/corneas clear, both eyes        Nose:   Nares normal, septum midline, no drainage    Throat:   Lips normal; teeth and gums normal   Neck:    symmetrical, trachea midline, ;     thyroid:  no enlargement/   Back:     Symmetric, no curvature, ROM normal   Lungs:   No audible wheezing or labored breathing   Chest Wall:    No tenderness or deformity    Heart:    Regular rate and rhythm               Pulses:   2+ and symmetric all extremities   Skin:   Skin color, texture, turgor normal, no rashes or lesions   Neurologic:   normal strength, sensation and reflexes     throughout       Musculoskeletal: bilateral lower extremity  On examination of the right knee there is no effusion, no erythema, valgus deformity noted.  Range of motion to full active extension and flexion to greater than 120°.  Pain on palpation medial and lateral joint lines.  There is crepitus with range of motion, no warmth to palpation, bony enlargement noted. No pain on palpation pes anserine bursa region or distal iliotibial band.   Stable to varus and valgus stress without pain or gapping.  Negative anterior and posterior drawer testing.  Sensation intact distal pulses present.  On examination of the left knee there is no effusion, no erythema, valgus deformity noted.  Range of motion to full active extension and flexion to greater than 120°.  Pain on palpation medial and lateral joint lines.  There is crepitus with range of motion, no warmth to palpation, bony enlargement noted. No pain on palpation pes anserine bursa region or distal iliotibial band.  Stable to varus and valgus stress without pain or gapping.  Negative anterior and posterior drawer testing.  Sensation intact distal pulses present.                          .        [1]   Past Medical History:  Diagnosis Date    Arthritis 1985    Cancer (HCC) 2018    skin cancer basal cell    GERD (gastroesophageal reflux disease) 1985    unsure of date    HL (hearing loss) lifetime    hearing aids in 2021    Hypertension     5 years    Shingles 1980’s    a few times over the past many years. the last time was in 2018   [2]   Past Surgical History:  Procedure Laterality Date    COLONOSCOPY      KNEE SURGERY  2006    arthroscopy left knee 2006    WISDOM TOOTH EXTRACTION     [3]   Family History  Problem Relation Name Age of Onset    No Known Problems Mother      Hypertension Father Oliverio Buckley     Diabetes Father Oliverio Buckley     Hearing loss Father Oliverio Buckley     Breast cancer Maternal Grandmother Radha Maynard     Colon cancer Maternal Grandfather Chris Grangery     Breast cancer Maternal Aunt Rosy Rosarioalonelo     Breast cancer Maternal Aunt Tara Maynard    [4]   Social History  Socioeconomic History    Marital status: /Civil Union   Tobacco Use    Smoking status: Never    Smokeless tobacco: Never   Vaping Use    Vaping status: Never Used   Substance and Sexual Activity    Alcohol use: Not Currently     Alcohol/week: 1.0 standard drink of alcohol     Types: 1 Standard drinks or  equivalent per week     Comment: rarely    Drug use: Never    Sexual activity: Yes     Partners: Male     Birth control/protection: Post-menopausal     Social Drivers of Health     Food Insecurity: No Food Insecurity (2/27/2025)    Nursing - Inadequate Food Risk Classification     Worried About Running Out of Food in the Last Year: Never true     Ran Out of Food in the Last Year: Never true   Transportation Needs: No Transportation Needs (2/27/2025)    PRAPARE - Transportation     Lack of Transportation (Medical): No     Lack of Transportation (Non-Medical): No   Housing Stability: Low Risk  (2/27/2025)    Housing Stability Vital Sign     Unable to Pay for Housing in the Last Year: No     Number of Times Moved in the Last Year: 0     Homeless in the Last Year: No   [5] No Known Allergies  [6]   Current Outpatient Medications:     celecoxib (CeleBREX) 200 mg capsule, TAKE 1 CAPSULE BY MOUTH TWO TIMES DAILY WITH FOOD, Disp: 180 capsule, Rfl: 0    FLUoxetine (PROzac) 10 mg capsule, TAKE 1 CAPSULE BY MOUTH EVERY DAY, Disp: 90 capsule, Rfl: 0    hydroCHLOROthiazide 25 mg tablet, TAKE 1 TABLET (25 MG TOTAL) BY MOUTH DAILY., Disp: 30 tablet, Rfl: 5    meloxicam (MOBIC) 7.5 mg tablet, TAKE 1 TABLET (7.5 MG TOTAL) BY MOUTH DAILY AS NEEDED FOR MODERATE PAIN, Disp: 90 tablet, Rfl: 0    multivitamin (THERAGRAN) TABS, Take 1 tablet by mouth daily, Disp: , Rfl:     omeprazole (PriLOSEC) 20 mg delayed release capsule, Take 1 capsule (20 mg total) by mouth daily, Disp: 90 capsule, Rfl: 1

## 2025-07-25 ENCOUNTER — HOSPITAL ENCOUNTER (OUTPATIENT)
Dept: RADIOLOGY | Age: 65
Discharge: HOME | End: 2025-07-25
Payer: COMMERCIAL

## 2025-07-25 VITALS — WEIGHT: 196 LBS | BODY MASS INDEX: 33.46 KG/M2 | HEIGHT: 64 IN

## 2025-07-25 DIAGNOSIS — M81.0 OSTEOPOROSIS, UNSPECIFIED OSTEOPOROSIS TYPE, UNSPECIFIED PATHOLOGICAL FRACTURE PRESENCE: ICD-10-CM

## 2025-07-25 PROCEDURE — 77080 DXA BONE DENSITY AXIAL: CPT

## 2025-08-18 DIAGNOSIS — M17.0 PRIMARY OSTEOARTHRITIS OF BOTH KNEES: ICD-10-CM

## 2025-08-18 RX ORDER — CELECOXIB 200 MG/1
CAPSULE ORAL
Qty: 180 CAPSULE | Refills: 0 | Status: SHIPPED | OUTPATIENT
Start: 2025-08-18

## 2025-08-20 LAB
ALBUMIN SERPL-MCNC: 4.1 G/DL (ref 3.6–5.1)
ALBUMIN/GLOB SERPL: 1.3 (CALC) (ref 1–2.5)
ALP SERPL-CCNC: 77 U/L (ref 37–153)
ALT SERPL-CCNC: 29 U/L (ref 6–29)
AST SERPL-CCNC: 26 U/L (ref 10–35)
BILIRUB SERPL-MCNC: 1 MG/DL (ref 0.2–1.2)
BUN SERPL-MCNC: 15 MG/DL (ref 7–25)
BUN/CREAT SERPL: ABNORMAL (CALC) (ref 6–22)
CALCIUM SERPL-MCNC: 9.2 MG/DL (ref 8.6–10.4)
CHLORIDE SERPL-SCNC: 99 MMOL/L (ref 98–110)
CHOLEST SERPL-MCNC: 210 MG/DL
CHOLEST/HDLC SERPL: 4 (CALC)
CO2 SERPL-SCNC: 27 MMOL/L (ref 20–32)
CREAT SERPL-MCNC: 0.68 MG/DL (ref 0.5–1.05)
GFR/BSA.PRED SERPLBLD CYS-BASED-ARV: 96 ML/MIN/1.73M2
GLOBULIN SER CALC-MCNC: 3.2 G/DL (CALC) (ref 1.9–3.7)
GLUCOSE SERPL-MCNC: 102 MG/DL (ref 65–99)
HBA1C MFR BLD: 6 %
HDLC SERPL-MCNC: 53 MG/DL
LDLC SERPL CALC-MCNC: 133 MG/DL (CALC)
NONHDLC SERPL-MCNC: 157 MG/DL (CALC)
POTASSIUM SERPL-SCNC: 3.5 MMOL/L (ref 3.5–5.3)
PROT SERPL-MCNC: 7.3 G/DL (ref 6.1–8.1)
SODIUM SERPL-SCNC: 136 MMOL/L (ref 135–146)
TRIGL SERPL-MCNC: 129 MG/DL

## 2025-08-26 PROBLEM — M85.9 LOW BONE DENSITY: Status: ACTIVE | Noted: 2025-08-26

## 2025-08-27 PROBLEM — H60.502 ACUTE OTITIS EXTERNA OF LEFT EAR: Status: ACTIVE | Noted: 2025-08-27
